# Patient Record
Sex: MALE | Race: WHITE | NOT HISPANIC OR LATINO | URBAN - METROPOLITAN AREA
[De-identification: names, ages, dates, MRNs, and addresses within clinical notes are randomized per-mention and may not be internally consistent; named-entity substitution may affect disease eponyms.]

---

## 2017-02-03 ENCOUNTER — OUTPATIENT (OUTPATIENT)
Dept: OUTPATIENT SERVICES | Facility: HOSPITAL | Age: 37
LOS: 1 days | Discharge: HOME | End: 2017-02-03

## 2017-05-02 ENCOUNTER — OUTPATIENT (OUTPATIENT)
Dept: OUTPATIENT SERVICES | Facility: HOSPITAL | Age: 37
LOS: 1 days | Discharge: HOME | End: 2017-05-02

## 2017-05-02 DIAGNOSIS — F33.2 MAJOR DEPRESSIVE DISORDER, RECURRENT SEVERE WITHOUT PSYCHOTIC FEATURES: ICD-10-CM

## 2017-06-28 DIAGNOSIS — D33.2 BENIGN NEOPLASM OF BRAIN, UNSPECIFIED: ICD-10-CM

## 2017-09-22 ENCOUNTER — OUTPATIENT (OUTPATIENT)
Dept: OUTPATIENT SERVICES | Facility: HOSPITAL | Age: 37
LOS: 1 days | Discharge: HOME | End: 2017-09-22

## 2017-09-22 DIAGNOSIS — F33.2 MAJOR DEPRESSIVE DISORDER, RECURRENT SEVERE WITHOUT PSYCHOTIC FEATURES: ICD-10-CM

## 2017-10-02 ENCOUNTER — TRANSCRIPTION ENCOUNTER (OUTPATIENT)
Age: 37
End: 2017-10-02

## 2017-11-29 ENCOUNTER — OUTPATIENT (OUTPATIENT)
Dept: OUTPATIENT SERVICES | Facility: HOSPITAL | Age: 37
LOS: 1 days | Discharge: HOME | End: 2017-11-29

## 2017-11-29 DIAGNOSIS — F33.2 MAJOR DEPRESSIVE DISORDER, RECURRENT SEVERE WITHOUT PSYCHOTIC FEATURES: ICD-10-CM

## 2017-12-27 ENCOUNTER — OUTPATIENT (OUTPATIENT)
Dept: OUTPATIENT SERVICES | Facility: HOSPITAL | Age: 37
LOS: 1 days | Discharge: HOME | End: 2017-12-27

## 2017-12-27 DIAGNOSIS — F33.2 MAJOR DEPRESSIVE DISORDER, RECURRENT SEVERE WITHOUT PSYCHOTIC FEATURES: ICD-10-CM

## 2018-01-10 ENCOUNTER — TRANSCRIPTION ENCOUNTER (OUTPATIENT)
Age: 38
End: 2018-01-10

## 2018-01-24 ENCOUNTER — OUTPATIENT (OUTPATIENT)
Dept: OUTPATIENT SERVICES | Facility: HOSPITAL | Age: 38
LOS: 1 days | Discharge: HOME | End: 2018-01-24

## 2018-01-30 DIAGNOSIS — F33.9 MAJOR DEPRESSIVE DISORDER, RECURRENT, UNSPECIFIED: ICD-10-CM

## 2018-01-30 DIAGNOSIS — T14.91XA SUICIDE ATTEMPT, INITIAL ENCOUNTER: ICD-10-CM

## 2018-01-30 DIAGNOSIS — S11.91XA LACERATION WITHOUT FOREIGN BODY OF UNSPECIFIED PART OF NECK, INITIAL ENCOUNTER: ICD-10-CM

## 2018-03-19 ENCOUNTER — TRANSCRIPTION ENCOUNTER (OUTPATIENT)
Age: 38
End: 2018-03-19

## 2018-05-01 ENCOUNTER — OUTPATIENT (OUTPATIENT)
Dept: OUTPATIENT SERVICES | Facility: HOSPITAL | Age: 38
LOS: 1 days | Discharge: HOME | End: 2018-05-01

## 2018-05-01 DIAGNOSIS — F33.2 MAJOR DEPRESSIVE DISORDER, RECURRENT SEVERE WITHOUT PSYCHOTIC FEATURES: ICD-10-CM

## 2018-05-09 ENCOUNTER — TRANSCRIPTION ENCOUNTER (OUTPATIENT)
Age: 38
End: 2018-05-09

## 2018-05-13 ENCOUNTER — EMERGENCY (EMERGENCY)
Facility: HOSPITAL | Age: 38
LOS: 0 days | Discharge: HOME | End: 2018-05-14
Attending: EMERGENCY MEDICINE | Admitting: EMERGENCY MEDICINE

## 2018-05-13 VITALS
HEART RATE: 82 BPM | OXYGEN SATURATION: 100 % | SYSTOLIC BLOOD PRESSURE: 126 MMHG | DIASTOLIC BLOOD PRESSURE: 82 MMHG | RESPIRATION RATE: 18 BRPM | TEMPERATURE: 97 F

## 2018-05-13 DIAGNOSIS — R42 DIZZINESS AND GIDDINESS: ICD-10-CM

## 2018-05-13 DIAGNOSIS — J34.9 UNSPECIFIED DISORDER OF NOSE AND NASAL SINUSES: ICD-10-CM

## 2018-05-13 DIAGNOSIS — R55 SYNCOPE AND COLLAPSE: ICD-10-CM

## 2018-05-13 LAB
ALBUMIN SERPL ELPH-MCNC: 4.3 G/DL — SIGNIFICANT CHANGE UP (ref 3.5–5.2)
ALP SERPL-CCNC: 100 U/L — SIGNIFICANT CHANGE UP (ref 30–115)
ALT FLD-CCNC: 16 U/L — SIGNIFICANT CHANGE UP (ref 0–41)
ANION GAP SERPL CALC-SCNC: 12 MMOL/L — SIGNIFICANT CHANGE UP (ref 7–14)
APPEARANCE UR: (no result)
APTT BLD: 30.2 SEC — SIGNIFICANT CHANGE UP (ref 27–39.2)
AST SERPL-CCNC: 14 U/L — SIGNIFICANT CHANGE UP (ref 0–41)
BASOPHILS # BLD AUTO: 0.03 K/UL — SIGNIFICANT CHANGE UP (ref 0–0.2)
BASOPHILS NFR BLD AUTO: 0.2 % — SIGNIFICANT CHANGE UP (ref 0–1)
BILIRUB DIRECT SERPL-MCNC: <0.2 MG/DL — SIGNIFICANT CHANGE UP (ref 0–0.2)
BILIRUB INDIRECT FLD-MCNC: >0 MG/DL — LOW (ref 0.2–1.2)
BILIRUB SERPL-MCNC: 0.2 MG/DL — SIGNIFICANT CHANGE UP (ref 0.2–1.2)
BILIRUB UR-MCNC: NEGATIVE — SIGNIFICANT CHANGE UP
BUN SERPL-MCNC: 10 MG/DL — SIGNIFICANT CHANGE UP (ref 10–20)
CALCIUM SERPL-MCNC: 8.9 MG/DL — SIGNIFICANT CHANGE UP (ref 8.5–10.1)
CHLORIDE SERPL-SCNC: 101 MMOL/L — SIGNIFICANT CHANGE UP (ref 98–110)
CO2 SERPL-SCNC: 26 MMOL/L — SIGNIFICANT CHANGE UP (ref 17–32)
COLOR SPEC: YELLOW — SIGNIFICANT CHANGE UP
CREAT SERPL-MCNC: 0.9 MG/DL — SIGNIFICANT CHANGE UP (ref 0.7–1.5)
DIFF PNL FLD: NEGATIVE — SIGNIFICANT CHANGE UP
EOSINOPHIL # BLD AUTO: 0.18 K/UL — SIGNIFICANT CHANGE UP (ref 0–0.7)
EOSINOPHIL NFR BLD AUTO: 1.4 % — SIGNIFICANT CHANGE UP (ref 0–8)
EPI CELLS # UR: (no result) /HPF
GLUCOSE SERPL-MCNC: 118 MG/DL — HIGH (ref 70–99)
GLUCOSE UR QL: NEGATIVE MG/DL — SIGNIFICANT CHANGE UP
HCT VFR BLD CALC: 42.5 % — SIGNIFICANT CHANGE UP (ref 42–52)
HGB BLD-MCNC: 14.4 G/DL — SIGNIFICANT CHANGE UP (ref 14–18)
IMM GRANULOCYTES NFR BLD AUTO: 0.7 % — HIGH (ref 0.1–0.3)
INR BLD: 1 RATIO — SIGNIFICANT CHANGE UP (ref 0.65–1.3)
KETONES UR-MCNC: NEGATIVE — SIGNIFICANT CHANGE UP
LEUKOCYTE ESTERASE UR-ACNC: NEGATIVE — SIGNIFICANT CHANGE UP
LYMPHOCYTES # BLD AUTO: 1.7 K/UL — SIGNIFICANT CHANGE UP (ref 1.2–3.4)
LYMPHOCYTES # BLD AUTO: 13.6 % — LOW (ref 20.5–51.1)
MCHC RBC-ENTMCNC: 30.2 PG — SIGNIFICANT CHANGE UP (ref 27–31)
MCHC RBC-ENTMCNC: 33.9 G/DL — SIGNIFICANT CHANGE UP (ref 32–37)
MCV RBC AUTO: 89.1 FL — SIGNIFICANT CHANGE UP (ref 80–94)
MONOCYTES # BLD AUTO: 0.51 K/UL — SIGNIFICANT CHANGE UP (ref 0.1–0.6)
MONOCYTES NFR BLD AUTO: 4.1 % — SIGNIFICANT CHANGE UP (ref 1.7–9.3)
NEUTROPHILS # BLD AUTO: 10.03 K/UL — HIGH (ref 1.4–6.5)
NEUTROPHILS NFR BLD AUTO: 80 % — HIGH (ref 42.2–75.2)
NITRITE UR-MCNC: NEGATIVE — SIGNIFICANT CHANGE UP
NRBC # BLD: 0 /100 WBCS — SIGNIFICANT CHANGE UP (ref 0–0)
PH UR: 7 — SIGNIFICANT CHANGE UP (ref 5–8)
PLATELET # BLD AUTO: 249 K/UL — SIGNIFICANT CHANGE UP (ref 130–400)
POTASSIUM SERPL-MCNC: 4.2 MMOL/L — SIGNIFICANT CHANGE UP (ref 3.5–5)
POTASSIUM SERPL-SCNC: 4.2 MMOL/L — SIGNIFICANT CHANGE UP (ref 3.5–5)
PROT SERPL-MCNC: 6.9 G/DL — SIGNIFICANT CHANGE UP (ref 6–8)
PROT UR-MCNC: NEGATIVE MG/DL — SIGNIFICANT CHANGE UP
PROTHROM AB SERPL-ACNC: 10.8 SEC — SIGNIFICANT CHANGE UP (ref 9.95–12.87)
RBC # BLD: 4.77 M/UL — SIGNIFICANT CHANGE UP (ref 4.7–6.1)
RBC # FLD: 11.9 % — SIGNIFICANT CHANGE UP (ref 11.5–14.5)
SODIUM SERPL-SCNC: 139 MMOL/L — SIGNIFICANT CHANGE UP (ref 135–146)
SP GR SPEC: 1.01 — SIGNIFICANT CHANGE UP (ref 1.01–1.03)
TROPONIN T SERPL-MCNC: <0.01 NG/ML — SIGNIFICANT CHANGE UP
UROBILINOGEN FLD QL: 1 MG/DL (ref 0.2–0.2)
WBC # BLD: 12.54 K/UL — HIGH (ref 4.8–10.8)
WBC # FLD AUTO: 12.54 K/UL — HIGH (ref 4.8–10.8)

## 2018-05-13 RX ORDER — SODIUM CHLORIDE 9 MG/ML
1000 INJECTION INTRAMUSCULAR; INTRAVENOUS; SUBCUTANEOUS ONCE
Qty: 0 | Refills: 0 | Status: COMPLETED | OUTPATIENT
Start: 2018-05-13 | End: 2018-05-13

## 2018-05-13 RX ORDER — ONDANSETRON 8 MG/1
4 TABLET, FILM COATED ORAL ONCE
Qty: 0 | Refills: 0 | Status: COMPLETED | OUTPATIENT
Start: 2018-05-13 | End: 2018-05-13

## 2018-05-13 RX ADMIN — ONDANSETRON 4 MILLIGRAM(S): 8 TABLET, FILM COATED ORAL at 23:21

## 2018-05-13 RX ADMIN — ONDANSETRON 104 MILLIGRAM(S): 8 TABLET, FILM COATED ORAL at 21:28

## 2018-05-13 RX ADMIN — SODIUM CHLORIDE 1000 MILLILITER(S): 9 INJECTION INTRAMUSCULAR; INTRAVENOUS; SUBCUTANEOUS at 20:01

## 2018-05-13 NOTE — ED PROVIDER NOTE - OBJECTIVE STATEMENT
patient states that on Wednesday, started feeling dizzy and lightheaded, passed out for 2-3mins, at that time went to urgent care center, had EKG done, was told could have been vertigo, and was recommended to go to ED for any recurrent symptoms. patient states that today started feeling dizzy again, described as lightheadedness, associated with nausea, also feels spinning sensation sometimes, states that symptoms lasted all day long, so decided to come to ED. Denies syncope this time, denies any HA/neck pain, denies any URI symptoms, denies any ear pain, denies any cp/sob/abd pain; +palpitations. Denies any diarrhea, denies any fever.

## 2018-05-13 NOTE — ED ADULT NURSE NOTE - CHPI ED SYMPTOMS NEG
no confusion/no vomiting/no numbness/no fever/no change in level of consciousness/no loss of consciousness/no blurred vision/no weakness

## 2018-05-13 NOTE — ED CDU PROVIDER INITIAL DAY NOTE - ATTENDING CONTRIBUTION TO CARE
38 yo male with unwitnessed syncope 5 days ago presented to ED for eval, head ct and labs negative.  Pt awaiting ECHO.

## 2018-05-13 NOTE — ED CDU PROVIDER INITIAL DAY NOTE - OBJECTIVE STATEMENT
38y/o male with pmh of migraine ha, c/o syncopal episode 5 days ago. pt. states at that time he was sitting and he passed out. brief, unwitnessed syncope at work. today comes in for nausea and dizziness. + mild ha. denies numbness, weakness, cp, sob, abdominal pain.

## 2018-05-13 NOTE — ED ADULT TRIAGE NOTE - CHIEF COMPLAINT QUOTE
Pt c/o feeling dizzy and passing out last week Wed, went to urgent care on Thurs and diagnosed with vertigo. Started feeling symptoms again yesterday, today c/o increasing dizziness and nausea

## 2018-05-13 NOTE — ED ADULT NURSE NOTE - OBJECTIVE STATEMENT
as per pt, he passed out last week, and urgent care told him he had vertigo, symptoms returned yesterday, pt. states he is currently dizzy and nauseous

## 2018-05-13 NOTE — ED CDU PROVIDER INITIAL DAY NOTE - NEURO NEGATIVE STATEMENT, MLM
no loss of consciousness, no gait abnormality, no headache, no sensory deficits, and no weakness. + dizziness, + syncope

## 2018-05-14 VITALS
HEART RATE: 70 BPM | OXYGEN SATURATION: 98 % | DIASTOLIC BLOOD PRESSURE: 58 MMHG | SYSTOLIC BLOOD PRESSURE: 114 MMHG | TEMPERATURE: 98 F | RESPIRATION RATE: 18 BRPM

## 2018-05-14 LAB
CK MB CFR SERPL CALC: 1.4 NG/ML — SIGNIFICANT CHANGE UP (ref 0.6–6.3)
CK SERPL-CCNC: 75 U/L — SIGNIFICANT CHANGE UP (ref 0–225)
TROPONIN T SERPL-MCNC: <0.01 NG/ML — SIGNIFICANT CHANGE UP

## 2018-05-14 NOTE — ED CDU PROVIDER SUBSEQUENT DAY NOTE - HISTORY
Pt seen at bedside, NAD. Arrived overnight, received from LUIS Giron. Pt presenting for syncope which occurred 5 days prior. Admits to dizziness and nausea. Head CT negative. Cardiac enzymes negative x 2. ECHO pending, will follow up with results

## 2018-05-14 NOTE — ED ADULT NURSE REASSESSMENT NOTE - NS ED NURSE REASSESS COMMENT FT1
Pt assessed A.O times 4 Vs stable on cardiac monitor denies no chest pain, no SOB ,no N/V  or dizziness ,breakfast tray provide did eat 100% ,NAD noted waiting for 2DEChO ED attending  made aware on going nursing observation .

## 2018-05-14 NOTE — ED ADULT NURSE REASSESSMENT NOTE - NS ED NURSE REASSESS COMMENT FT1
Pt remain comfortable no pain no SOB no N/V ,no dizziness at this time did eat 100% of lunch is seen evaluate by ED attending clear to go home with family members ,NAD noted ready to go home .

## 2018-05-14 NOTE — ED CDU PROVIDER DISPOSITION NOTE - CLINICAL COURSE
Pt seen for syncope 5 days prior to arrival. Head ct negative except sinus disease as noted, obs protocol for syncope with no acute findings. Pt asymptomatic. Pt to follow up with pmd and ent for sinus disease. Return precautions discussed.

## 2018-05-18 ENCOUNTER — INPATIENT (INPATIENT)
Facility: HOSPITAL | Age: 38
LOS: 10 days | Discharge: HOME | End: 2018-05-29
Attending: PSYCHIATRY & NEUROLOGY | Admitting: PSYCHIATRY & NEUROLOGY

## 2018-05-18 VITALS
HEIGHT: 64 IN | SYSTOLIC BLOOD PRESSURE: 112 MMHG | TEMPERATURE: 99 F | DIASTOLIC BLOOD PRESSURE: 58 MMHG | RESPIRATION RATE: 20 BRPM | WEIGHT: 143.96 LBS | HEART RATE: 90 BPM | OXYGEN SATURATION: 98 %

## 2018-05-18 DIAGNOSIS — K46.9 UNSPECIFIED ABDOMINAL HERNIA WITHOUT OBSTRUCTION OR GANGRENE: Chronic | ICD-10-CM

## 2018-05-18 DIAGNOSIS — F32.2 MAJOR DEPRESSIVE DISORDER, SINGLE EPISODE, SEVERE WITHOUT PSYCHOTIC FEATURES: ICD-10-CM

## 2018-05-18 DIAGNOSIS — F33.2 MAJOR DEPRESSIVE DISORDER, RECURRENT SEVERE WITHOUT PSYCHOTIC FEATURES: ICD-10-CM

## 2018-05-18 LAB
ALBUMIN SERPL ELPH-MCNC: 4.6 G/DL — SIGNIFICANT CHANGE UP (ref 3.5–5.2)
ALP SERPL-CCNC: 109 U/L — SIGNIFICANT CHANGE UP (ref 30–115)
ALT FLD-CCNC: 18 U/L — SIGNIFICANT CHANGE UP (ref 0–41)
ANION GAP SERPL CALC-SCNC: 16 MMOL/L — HIGH (ref 7–14)
APAP SERPL-MCNC: <5 UG/ML — LOW (ref 10–30)
AST SERPL-CCNC: 16 U/L — SIGNIFICANT CHANGE UP (ref 0–41)
BASOPHILS # BLD AUTO: 0.03 K/UL — SIGNIFICANT CHANGE UP (ref 0–0.2)
BASOPHILS NFR BLD AUTO: 0.3 % — SIGNIFICANT CHANGE UP (ref 0–1)
BILIRUB SERPL-MCNC: 0.2 MG/DL — SIGNIFICANT CHANGE UP (ref 0.2–1.2)
BUN SERPL-MCNC: 7 MG/DL — LOW (ref 10–20)
CALCIUM SERPL-MCNC: 9.7 MG/DL — SIGNIFICANT CHANGE UP (ref 8.5–10.1)
CHLORIDE SERPL-SCNC: 96 MMOL/L — LOW (ref 98–110)
CO2 SERPL-SCNC: 27 MMOL/L — SIGNIFICANT CHANGE UP (ref 17–32)
CREAT SERPL-MCNC: 0.9 MG/DL — SIGNIFICANT CHANGE UP (ref 0.7–1.5)
EOSINOPHIL # BLD AUTO: 0.26 K/UL — SIGNIFICANT CHANGE UP (ref 0–0.7)
EOSINOPHIL NFR BLD AUTO: 2.6 % — SIGNIFICANT CHANGE UP (ref 0–8)
ETHANOL SERPL-MCNC: <10 MG/DL — HIGH
GLUCOSE SERPL-MCNC: 190 MG/DL — HIGH (ref 70–99)
HCT VFR BLD CALC: 45 % — SIGNIFICANT CHANGE UP (ref 42–52)
HGB BLD-MCNC: 15.1 G/DL — SIGNIFICANT CHANGE UP (ref 14–18)
IMM GRANULOCYTES NFR BLD AUTO: 0.7 % — HIGH (ref 0.1–0.3)
LYMPHOCYTES # BLD AUTO: 1.7 K/UL — SIGNIFICANT CHANGE UP (ref 1.2–3.4)
LYMPHOCYTES # BLD AUTO: 16.7 % — LOW (ref 20.5–51.1)
MCHC RBC-ENTMCNC: 30.5 PG — SIGNIFICANT CHANGE UP (ref 27–31)
MCHC RBC-ENTMCNC: 33.6 G/DL — SIGNIFICANT CHANGE UP (ref 32–37)
MCV RBC AUTO: 90.9 FL — SIGNIFICANT CHANGE UP (ref 80–94)
MONOCYTES # BLD AUTO: 0.73 K/UL — HIGH (ref 0.1–0.6)
MONOCYTES NFR BLD AUTO: 7.2 % — SIGNIFICANT CHANGE UP (ref 1.7–9.3)
NEUTROPHILS # BLD AUTO: 7.37 K/UL — HIGH (ref 1.4–6.5)
NEUTROPHILS NFR BLD AUTO: 72.5 % — SIGNIFICANT CHANGE UP (ref 42.2–75.2)
NRBC # BLD: 0 /100 WBCS — SIGNIFICANT CHANGE UP (ref 0–0)
PLATELET # BLD AUTO: 251 K/UL — SIGNIFICANT CHANGE UP (ref 130–400)
POTASSIUM SERPL-MCNC: 4.3 MMOL/L — SIGNIFICANT CHANGE UP (ref 3.5–5)
POTASSIUM SERPL-SCNC: 4.3 MMOL/L — SIGNIFICANT CHANGE UP (ref 3.5–5)
PROT SERPL-MCNC: 7.3 G/DL — SIGNIFICANT CHANGE UP (ref 6–8)
RBC # BLD: 4.95 M/UL — SIGNIFICANT CHANGE UP (ref 4.7–6.1)
RBC # FLD: 12 % — SIGNIFICANT CHANGE UP (ref 11.5–14.5)
SALICYLATES SERPL-MCNC: <0.3 MG/DL — LOW (ref 4–30)
SODIUM SERPL-SCNC: 139 MMOL/L — SIGNIFICANT CHANGE UP (ref 135–146)
WBC # BLD: 10.16 K/UL — SIGNIFICANT CHANGE UP (ref 4.8–10.8)
WBC # FLD AUTO: 10.16 K/UL — SIGNIFICANT CHANGE UP (ref 4.8–10.8)

## 2018-05-18 RX ORDER — CITALOPRAM 10 MG/1
40 TABLET, FILM COATED ORAL DAILY
Qty: 0 | Refills: 0 | Status: DISCONTINUED | OUTPATIENT
Start: 2018-05-18 | End: 2018-05-22

## 2018-05-18 RX ORDER — ARIPIPRAZOLE 15 MG/1
10 TABLET ORAL DAILY
Qty: 0 | Refills: 0 | Status: DISCONTINUED | OUTPATIENT
Start: 2018-05-18 | End: 2018-05-22

## 2018-05-18 RX ADMIN — ARIPIPRAZOLE 10 MILLIGRAM(S): 15 TABLET ORAL at 20:19

## 2018-05-18 RX ADMIN — CITALOPRAM 40 MILLIGRAM(S): 10 TABLET, FILM COATED ORAL at 20:19

## 2018-05-18 NOTE — ED PROVIDER NOTE - PHYSICAL EXAMINATION
GEN: Well appearing.    HEAD:  Normocephalic, atraumatic.    EYES:  Clear conjunctivae without injection, drainage or discharge.    ENMT:  Nasal mucosa moist; mouth moist without ulcerations or lesions; throat moist without erythema, exudate, ulcerations or lesions.    NECK:  Supple, no masses. Normal ROM.    CARDIAC:  RRR, normal S1 and S2, no murmurs, rubs or gallops.    RESP:  Respiratory rate and effort appear normal; lungs are clear to auscultation bilaterally; no rhonchi, rales or wheezes.    ABDOMEN:  Soft, non-tender, non-distended, no masses. Normal BS throughout.    NEURO:  AAO x 3. Motor 5/5.     SKIN:  Normal skin color for age and race, well-perfused; warm and dry.

## 2018-05-18 NOTE — BEHAVIORAL HEALTH ASSESSMENT NOTE - SUMMARY
presenting with acute exacerbation of depression and suicidal thought with fear of being alone. despite medication management needs ipp for safety and stabilization.

## 2018-05-18 NOTE — ED PROVIDER NOTE - PROGRESS NOTE DETAILS
Dr. Goode (Psych) eval in the ED and will adm.  Pt to be held in ED pending psych adm. Received from Dr Temple. Pt medically cleared on  hold Pt was endorsed to Dr. Smith 5/19/18 19:30, pending bed availability. Signed out to Dr. Kellogg. Awaiting psych bed. Pt has had no acute events today; Pt has no complaints. Pt endorsed to Dr. Smith. Signed out to Dr. Chapa.

## 2018-05-18 NOTE — BEHAVIORAL HEALTH ASSESSMENT NOTE - OTHER PAST PSYCHIATRIC HISTORY (INCLUDE DETAILS REGARDING ONSET, COURSE OF ILLNESS, INPATIENT/OUTPATIENT TREATMENT)
Dx of MDD Recurrent severe suicide attempt two years ago ipp currently attends opd.  celexa 40 mg po daily, abilify 10 mg po hs

## 2018-05-18 NOTE — ED PROVIDER NOTE - NS ED ROS FT
Constitutional: No fevers/chills.    Head: No injury, headache.    Eyes:  No visual changes, eye pain or discharge. No injury.    ENMT:  No hearing changes, pain, discharge or infections. No neck pain or stiffness. No loss ROM.    Cardiac:  No chest pain, SOB or edema. No chest pain with exertion.    Respiratory:  No cough or respiratory distress.     GI:  No nausea, vomiting, diarrhea or abdominal pain. No anorexia. No change in PO intake.    :  No frequency, urgency or burning. No change in urine output.    MS:  No leg pain, leg swelling, myalgia, muscle weakness, joint pain or back pain. No loss ROM.    Neuro:  No dizziness or weakness.  No LOC.    Skin:  No skin rash. Constitutional: No fevers/chills.    Head: No injury, headache.    Eyes:  No visual changes, eye pain or discharge. No injury.    ENMT:  No hearing changes, pain, discharge or infections. No neck pain or stiffness. No loss ROM.    Cardiac:  No chest pain, SOB or edema. No chest pain with exertion.    Respiratory:  No cough or respiratory distress.     GI:  No nausea, vomiting, diarrhea or abdominal pain. No anorexia. No change in PO intake.    :  No frequency, urgency or burning. No change in urine output.    MS:  No leg pain, leg swelling, myalgia, muscle weakness, joint pain or back pain. No loss ROM.    Neuro:  No dizziness or weakness.  No LOC.    Skin:  No skin rash.    Psych:  +depressed, +SI

## 2018-05-18 NOTE — ED ADULT TRIAGE NOTE - CHIEF COMPLAINT QUOTE
"I've been depressed for the last month and half." Pt advises he has a history of depression. Over last month and half he transitioned to new therapist. He wasn't taking his medications during this time and only restarted one week ago (Abilify and Citalopram).

## 2018-05-18 NOTE — BEHAVIORAL HEALTH ASSESSMENT NOTE - PROBLEM SELECTOR PLAN 1
admit to ipp  enhanced supervision  celexa 40 mg po daily   abilify 10 mg po daily  ativan 1 mg q 6hrs prn  group and individual therapy

## 2018-05-18 NOTE — BEHAVIORAL HEALTH ASSESSMENT NOTE - HPI (INCLUDE ILLNESS QUALITY, SEVERITY, DURATION, TIMING, CONTEXT, MODIFYING FACTORS, ASSOCIATED SIGNS AND SYMPTOMS)
pt came in with his friend. c/o feeling increasingly depressed for the past 1 month and medication treatment was interrupted due to change in doctors. he is currently f/u at [facility] opd by [facility] resident MD. Mood feels low , dysphoric, loss of interest poor focus concentration poor sleep for the past week. increasingly worried about full relapse in sx and not feeling safe with himself due to suicidal ideation and past attempt.  denies any drug or alcohol use. denies any panic episode. no manic sx. denies any psychotic sx. works two jobs lives with parents who is not very understanding or supportive.

## 2018-05-19 RX ADMIN — ARIPIPRAZOLE 10 MILLIGRAM(S): 15 TABLET ORAL at 12:51

## 2018-05-19 RX ADMIN — Medication 1 MILLIGRAM(S): at 02:04

## 2018-05-19 RX ADMIN — CITALOPRAM 40 MILLIGRAM(S): 10 TABLET, FILM COATED ORAL at 12:51

## 2018-05-19 RX ADMIN — Medication 1 MILLIGRAM(S): at 22:58

## 2018-05-20 RX ADMIN — ARIPIPRAZOLE 10 MILLIGRAM(S): 15 TABLET ORAL at 12:22

## 2018-05-20 RX ADMIN — CITALOPRAM 40 MILLIGRAM(S): 10 TABLET, FILM COATED ORAL at 12:22

## 2018-05-21 DIAGNOSIS — Z98.890 OTHER SPECIFIED POSTPROCEDURAL STATES: Chronic | ICD-10-CM

## 2018-05-21 DIAGNOSIS — F32.9 MAJOR DEPRESSIVE DISORDER, SINGLE EPISODE, UNSPECIFIED: ICD-10-CM

## 2018-05-21 RX ORDER — CITALOPRAM 10 MG/1
40 TABLET, FILM COATED ORAL DAILY
Qty: 0 | Refills: 0 | Status: DISCONTINUED | OUTPATIENT
Start: 2018-05-21 | End: 2018-05-22

## 2018-05-21 RX ORDER — ARIPIPRAZOLE 15 MG/1
10 TABLET ORAL DAILY
Qty: 0 | Refills: 0 | Status: DISCONTINUED | OUTPATIENT
Start: 2018-05-21 | End: 2018-05-22

## 2018-05-21 RX ORDER — CLONAZEPAM 1 MG
0.5 TABLET ORAL EVERY 12 HOURS
Qty: 0 | Refills: 0 | Status: DISCONTINUED | OUTPATIENT
Start: 2018-05-21 | End: 2018-05-24

## 2018-05-21 RX ADMIN — CITALOPRAM 40 MILLIGRAM(S): 10 TABLET, FILM COATED ORAL at 11:21

## 2018-05-21 RX ADMIN — ARIPIPRAZOLE 10 MILLIGRAM(S): 15 TABLET ORAL at 11:21

## 2018-05-21 NOTE — H&P ADULT - NSHPPHYSICALEXAM_GEN_ALL_CORE
PHYSICAL EXAM:    Vital Signs Last 24 Hrs    T(F): 96.5 (05-21-18 @ 16:53), Max: 98 (05-21-18 @ 11:30)  HR: 66 (05-21-18 @ 16:53) (60 - 96)  BP: 128/61 (05-21-18 @ 15:07)  RR: 16 (05-21-18 @ 16:53) (15 - 18)  SpO2: 97% (05-21-18 @ 15:07) (97% - 98%)    Constitutional: NAD, A&O x3    Eyes: PERRLA    Respiratory: +air entry, no rales, no rhonchi, no wheezes    Cardiovascular: +S1 and S2, regular rate and rhythm, No murmur    Gastrointestinal: +BS, soft, non-tender, not distended    Extremities:  no edema, no calf tenderness    Vascular: +dorsal pedis and radial pulses, no extremity cyanosis    Neurological: sensation intact, ROM equal B/L, CN II-XII intact    Skin: no rashes, normal turgor

## 2018-05-21 NOTE — PROGRESS NOTE ADULT - SUBJECTIVE AND OBJECTIVE BOX
pt had good sleep. mood continue to be depressed, anxious and feels unsafe to return home due to fear of hurting himself. motivated to stay on medications and work through therapy. no psychosis. no threat to others. i/j fair.

## 2018-05-21 NOTE — H&P ADULT - HISTORY OF PRESENT ILLNESS
34 y/o male seen in [facility] for feelings of incresased Depression X 1 1/2 month duration. PSH: Depression, 2 prior admissions to IPP units in the past. There was a problem with his Psych meds when transitioning from one Doctor to another. he missed his Psychiatric meds for about 1 month. Normally takes Abilify and Celexa at home. 36 y/o male seen in [facility] 5/18/18 for feelings of incresased Depression X 1 1/2 month duration. PSH: Depression, 2 prior admissions to IPP units in the past. There was a problem with his Psych meds when transitioning from one Doctor to another. he missed his Psychiatric meds for about 1 month. Normally takes Abilify and Celexa at home. 34 y/o male seen in [facility] 5/[facility]/18 for feelings of increased Depression X 1 1/2 month duration. PSH: Depression, 2 prior admissions to IPP units in the past. There was a problem with his Psych meds when transitioning from one Doctor to another. he missed his Psychiatric meds for about 1 month. Normally takes Abilify and Celexa at home.

## 2018-05-21 NOTE — ED ADULT NURSE REASSESSMENT NOTE - NS ED NURSE REASSESS COMMENT FT1
Pt resting comfortably at this time.
PT is admitted in [facility], awaiting bed for psych unit; pt and fam made aware. PT denies any SI/HI. Pt is calm and cooperative throughout admission process. PT is a 1/2 ppd smoker, refuses nicotine cessation, refused flu vaccine. Safety measures in place will monitor
received pt as ER Psych hold, on 1:1 sit for safety, no distress, needs attended, denies thoughts of hurting self as of this time, kept safe & comfortable
Patient assessed, vital signs stable, no acute distress present, 1:1 at bedside, will continue to closely monitor.

## 2018-05-22 RX ORDER — ARIPIPRAZOLE 15 MG/1
5 TABLET ORAL DAILY
Qty: 0 | Refills: 0 | Status: DISCONTINUED | OUTPATIENT
Start: 2018-05-23 | End: 2018-05-29

## 2018-05-22 RX ORDER — CITALOPRAM 10 MG/1
20 TABLET, FILM COATED ORAL DAILY
Qty: 0 | Refills: 0 | Status: DISCONTINUED | OUTPATIENT
Start: 2018-05-22 | End: 2018-05-22

## 2018-05-22 RX ORDER — CITALOPRAM 10 MG/1
20 TABLET, FILM COATED ORAL DAILY
Qty: 0 | Refills: 0 | Status: DISCONTINUED | OUTPATIENT
Start: 2018-05-23 | End: 2018-05-25

## 2018-05-22 RX ORDER — ARIPIPRAZOLE 15 MG/1
5 TABLET ORAL DAILY
Qty: 0 | Refills: 0 | Status: DISCONTINUED | OUTPATIENT
Start: 2018-05-22 | End: 2018-05-22

## 2018-05-22 RX ADMIN — CITALOPRAM 40 MILLIGRAM(S): 10 TABLET, FILM COATED ORAL at 08:15

## 2018-05-22 RX ADMIN — ARIPIPRAZOLE 10 MILLIGRAM(S): 15 TABLET ORAL at 08:16

## 2018-05-22 NOTE — CHART NOTE - NSCHARTNOTEFT_GEN_A_CORE
Social work admit note:    PT is a 37 year old single white male brought to the ED by his friend for mental health evaluation due to feeling increasingly depressed for the past 1 month after medication treatment was interrupted due to change in MD at his out patient clinic.     PT is currently receiving out patient treatment at [facility] by Dr. Galvan. PT reports that mood is low, dysphoric, loss of interest, poor focus/concentration, insomnia for the past week. PT last admitted to [facility] 6/2016 after serious suicide attempt via laceration to the throat.    PT reports some recreational marijuana use, denies substance abuse or dependance, denies any legal issues. PT lives with parents, he is employed at a local hotel, he does not feel that his parents are understanding or supportive of his mental health issues.    See social work assessment for further information. PT was admitted on involuntary status.

## 2018-05-22 NOTE — CONSULT NOTE ADULT - SUBJECTIVE AND OBJECTIVE BOX
DARREN GAINES  37y  Male      Patient is a 37y old  Male who presents with a chief complaint of Depression (21 May 2018 17:51)    HPI:  36 y/o male seen in [facility] 5/18/18 for feelings of increased Depression X 1 1/2 month duration. PSH: Depression, 2 prior admissions to IPP units in the past. There was a problem with his Psych meds when transitioning from one Doctor to another. he missed his Psychiatric meds for about 1 month. Normally takes Abilify and Celexa at home. (21 May 2018 17:51)    INTERVAL HPI/OVERNIGHT EVENTS:  HEALTH ISSUES - PROBLEM Dx:  Depression: Depression  Severe episode of recurrent major depressive disorder, without psychotic features  Current severe episode of major depressive disorder without psychotic features without prior episode: Current severe episode of major depressive disorder without psychotic features without prior episode        PAST MEDICAL & SURGICAL HISTORY:  Vertigo  Depression  History of inguinal hernia repair: Left    FAMILY HISTORY:  No pertinent family history in first degree relatives    ARIPiprazole 10 milliGRAM(s) Oral daily  ARIPiprazole 10 milliGRAM(s) Oral daily  citalopram 40 milliGRAM(s) Oral daily  citalopram 40 milliGRAM(s) Oral daily  clonazePAM Tablet 0.5 milliGRAM(s) Oral every 12 hours PRN  LORazepam     Tablet 1 milliGRAM(s) Oral every 6 hours PRN    Allergies    No Known Drug Allergies  strawberry (Rash)    Intolerances        REVIEW OF SYSTEMS:  CONSTITUTIONAL: No fever, weight loss, or fatigue  EYES: No eye pain, visual disturbances, or discharge  ENMT:  No difficulty hearing, tinnitus, vertigo; No sinus or throat pain  NECK: No pain or stiffness  BREASTS: No pain, masses, or nipple discharge  RESPIRATORY: No cough, wheezing, chills or hemoptysis; No shortness of breath  CARDIOVASCULAR: No chest pain, palpitations, dizziness, or leg swelling  GASTROINTESTINAL: No abdominal or epigastric pain. No nausea, vomiting, or hematemesis; No diarrhea or constipation. No melena or hematochezia.  GENITOURINARY: No dysuria, frequency, hematuria, or incontinence  NEUROLOGICAL: No headaches, memory loss, loss of strength, numbness, or tremors  SKIN: No itching, burning, rashes, or lesions   LYMPH NODES: No enlarged glands  ENDOCRINE: No heat or cold intolerance; No hair loss  MUSCULOSKELETAL: No joint pain or swelling; No muscle, back, or extremity pain  PSYCHIATRIC: as per hpi and previous psych history  HEME/LYMPH: No easy bruising, or bleeding gums  ALLERY AND IMMUNOLOGIC: No hives or eczema    T(C): 35.7 (05-22-18 @ 05:55), Max: 36.7 (05-21-18 @ 11:30)  HR: 56 (05-22-18 @ 05:55) (56 - 76)  BP: 115/52 (05-22-18 @ 05:55) (100/66 - 128/61)  RR: 19 (05-22-18 @ 05:55) (16 - 19)  SpO2: 97% (05-21-18 @ 15:07) (97% - 97%)  Wt(kg): --Vital Signs Last 24 Hrs  T(C): 35.7 (22 May 2018 05:55), Max: 36.7 (21 May 2018 11:30)  T(F): 96.3 (22 May 2018 05:55), Max: 98 (21 May 2018 11:30)  HR: 56 (22 May 2018 05:55) (56 - 76)  BP: 115/52 (22 May 2018 05:55) (100/66 - 128/61)  BP(mean): --  RR: 19 (22 May 2018 05:55) (16 - 19)  SpO2: 97% (21 May 2018 15:07) (97% - 97%)    PHYSICAL EXAM:  GENERAL: NAD, well-groomed, well-developed  HEAD:  Atraumatic, Normocephalic  EYES: EOMI, PERRLA, conjunctiva and sclera clear  ENMT: No tonsillar erythema, exudates, or enlargement; Moist mucous membranes, Good dentition, No lesions  NECK: Supple, No JVD, Normal thyroid  NERVOUS SYSTEM:  Alert & Oriented X3, Good concentration; Motor Strength 5/5 B/L upper and lower extremities; DTRs 2+ intact and symmetric  CHEST/LUNG: Clear to percussion bilaterally; No rales, rhonchi, wheezing, or rubs  HEART: Regular rate and rhythm; No murmurs, rubs, or gallops  ABDOMEN: Soft, Nontender, Nondistended; Bowel sounds present  EXTREMITIES:  2+ Peripheral Pulses, No clubbing, cyanosis, or edema  LYMPH: No lymphadenopathy noted  SKIN: No rashes or lesions  Neuro: alert  no focal deficits    Consultant(s) Notes Reviewed:  [x ] YES  [ ] NO  Care Discussed with Consultants/Other Providers [ x] YES  [ ] NO    LABS:              CAPILLARY BLOOD GLUCOSE                RADIOLOGY & ADDITIONAL TESTS:    Imaging Personally Reviewed:  [ ] YES  [ ] NO

## 2018-05-23 DIAGNOSIS — F33.41 MAJOR DEPRESSIVE DISORDER, RECURRENT, IN PARTIAL REMISSION: ICD-10-CM

## 2018-05-23 RX ADMIN — ARIPIPRAZOLE 5 MILLIGRAM(S): 15 TABLET ORAL at 08:04

## 2018-05-23 RX ADMIN — CITALOPRAM 20 MILLIGRAM(S): 10 TABLET, FILM COATED ORAL at 08:04

## 2018-05-23 RX ADMIN — Medication 1 MILLIGRAM(S): at 21:53

## 2018-05-23 NOTE — PROGRESS NOTE BEHAVIORAL HEALTH - NSBHCHARTREVIEWINVESTIGATE_PSY_A_CORE FT
QTC Calculation(Bezet) 388 ms    P Axis 61 degrees    R Axis 78 degrees    T Axis 63 degrees    Diagnosis Line Normal sinus rhythm  Normal ECG    Confirmed by MOY HART, MARSHALL ([id number]) on 5/19/2018 8:23:39 AM
QTC Calculation(Bezet) 388 ms    P Axis 61 degrees    R Axis 78 degrees    T Axis 63 degrees    Diagnosis Line Normal sinus rhythm  Normal ECG    Confirmed by MOY HART, MARSHALL ([id number]) on 5/19/2018 8:23:39 AM

## 2018-05-24 RX ADMIN — ARIPIPRAZOLE 5 MILLIGRAM(S): 15 TABLET ORAL at 08:18

## 2018-05-24 RX ADMIN — CITALOPRAM 20 MILLIGRAM(S): 10 TABLET, FILM COATED ORAL at 08:18

## 2018-05-24 NOTE — PROGRESS NOTE ADULT - SUBJECTIVE AND OBJECTIVE BOX
pt stable alert in NAD  no new complaints    DEPRESSION  ^PANIC ATTACK  No h/o HF  No pertinent family history in first degree relatives  Handoff  MEWS Score  Vertigo  Depression  No pertinent past medical history  Depression  Recurrent major depressive disorder, in partial remission  Depression  Severe episode of recurrent major depressive disorder, without psychotic features  Current severe episode of major depressive disorder without psychotic features without prior episode  History of inguinal hernia repair  Abdominal hernia  No significant past surgical history  PANIC ATTACK  4    HEALTH ISSUES - PROBLEM Dx:  Recurrent major depressive disorder, in partial remission: Recurrent major depressive disorder, in partial remission  Depression: Depression  Severe episode of recurrent major depressive disorder, without psychotic features  Current severe episode of major depressive disorder without psychotic features without prior episode: Current severe episode of major depressive disorder without psychotic features without prior episode        PAST MEDICAL & SURGICAL HISTORY:  Vertigo  Depression  History of inguinal hernia repair: Left    No Known Drug Allergies  strawberry (Rash)      FAMILY HISTORY:  No pertinent family history in first degree relatives      ARIPiprazole 5 milliGRAM(s) Oral daily  citalopram 20 milliGRAM(s) Oral daily  clonazePAM Tablet 0.5 milliGRAM(s) Oral every 12 hours PRN  LORazepam     Tablet 1 milliGRAM(s) Oral every 6 hours PRN      T(C): 36.1 (05-24-18 @ 06:19), Max: 36.5 (05-23-18 @ 18:45)  HR: 83 (05-24-18 @ 06:19) (68 - 83)  BP: 107/57 (05-24-18 @ 06:19) (107/57 - 130/64)  RR: 17 (05-24-18 @ 06:19) (16 - 18)  SpO2: --    PE;  general:  stabel israel cute changes    Lungs:    Heart:    EXT:    Neuro: no deficits                          CAPILLARY BLOOD GLUCOSE

## 2018-05-25 RX ORDER — ACETAMINOPHEN 500 MG
650 TABLET ORAL EVERY 6 HOURS
Qty: 0 | Refills: 0 | Status: DISCONTINUED | OUTPATIENT
Start: 2018-05-25 | End: 2018-05-29

## 2018-05-25 RX ORDER — CITALOPRAM 10 MG/1
40 TABLET, FILM COATED ORAL DAILY
Qty: 0 | Refills: 0 | Status: DISCONTINUED | OUTPATIENT
Start: 2018-05-25 | End: 2018-05-29

## 2018-05-25 RX ADMIN — CITALOPRAM 20 MILLIGRAM(S): 10 TABLET, FILM COATED ORAL at 08:35

## 2018-05-25 RX ADMIN — Medication 650 MILLIGRAM(S): at 10:06

## 2018-05-25 RX ADMIN — CITALOPRAM 40 MILLIGRAM(S): 10 TABLET, FILM COATED ORAL at 16:10

## 2018-05-25 RX ADMIN — ARIPIPRAZOLE 5 MILLIGRAM(S): 15 TABLET ORAL at 08:35

## 2018-05-26 LAB
CHOLEST SERPL-MCNC: 162 MG/DL — SIGNIFICANT CHANGE UP (ref 100–200)
ESTIMATED AVERAGE GLUCOSE: 154 MG/DL — HIGH (ref 68–114)
HBA1C BLD-MCNC: 7 % — HIGH (ref 4–5.6)
HDLC SERPL-MCNC: 43 MG/DL — SIGNIFICANT CHANGE UP (ref 40–125)
LIPID PNL WITH DIRECT LDL SERPL: 99 MG/DL — SIGNIFICANT CHANGE UP (ref 4–129)
TOTAL CHOLESTEROL/HDL RATIO MEASUREMENT: 3.8 RATIO — LOW (ref 4–5.5)
TRIGL SERPL-MCNC: 131 MG/DL — SIGNIFICANT CHANGE UP (ref 10–149)

## 2018-05-26 RX ORDER — TRAZODONE HCL 50 MG
50 TABLET ORAL ONCE
Qty: 0 | Refills: 0 | Status: COMPLETED | OUTPATIENT
Start: 2018-05-26 | End: 2018-05-26

## 2018-05-26 RX ADMIN — Medication 50 MILLIGRAM(S): at 22:56

## 2018-05-26 RX ADMIN — ARIPIPRAZOLE 5 MILLIGRAM(S): 15 TABLET ORAL at 08:17

## 2018-05-26 RX ADMIN — CITALOPRAM 40 MILLIGRAM(S): 10 TABLET, FILM COATED ORAL at 08:17

## 2018-05-26 NOTE — PROGRESS NOTE BEHAVIORAL HEALTH - PROBLEM SELECTOR PROBLEM 1
Recurrent major depressive disorder, in partial remission

## 2018-05-26 NOTE — PROGRESS NOTE BEHAVIORAL HEALTH - SUMMARY
Pt is a 36 yo M, single, employed, with a history of major depressive disorder. He presented to the ED with worsening depression and suicidal ideation in the context of having run out of his medication for about 1 month. Pt states that his current mood is better but still low with prominent anhedonia and low energy. Pt is currently denying SI, HI, AVH.
Pt known to [facility] opd, works with Dr Galvan, pt yesterday presented to ER with friend with worsening mood thoughts of SI and worry that he may do something to harm himself. Pt admitted and in ER hold on 1:1 obs given lack of IPP beds. Tolerating Celexa, has Ativan as PRN.
Pt is a 38 yo M, single, employed, with a history of major depressive disorder. He presented to the ED with worsening depression and suicidal ideation in the context of having run out of his medication for about 1 month. Pt states that his current mood is better but still low with prominent anhedonia and low energy. Pt is currently denying SI, HI, AVH.
Pt is a 36 yo M, single, employed, with a history of major depressive disorder. He presented to the ED with worsening depression and suicidal ideation in the context of having run out of his medication for about 1 month. Pt states that his current mood is better but still low with prominent anhedonia and low energy. Pt is currently denying SI, HI, AVH.
Pt is a 36 yo M, single, employed, with a history of major depressive disorder. He presented to the ED with worsening depression and suicidal ideation in the context of having run out of his medication for about 1 month. Pt states that his current mood is better but still low with prominent anhedonia and low energy. Pt is currently denying SI, HI, AVH.
Pt is a 38 yo M, single, employed, with a history of major depressive disorder. He presented to the ED with worsening depression and suicidal ideation in the context of having run out of his medication for about 1 month. Pt states that his current mood is better but still low with prominent anhedonia and low energy. Pt is currently denying SI, HI, AVH.

## 2018-05-26 NOTE — PROGRESS NOTE BEHAVIORAL HEALTH - NSBHADMITDANGERSELF_PSY_A_CORE
suicidal ideation with plan and means

## 2018-05-26 NOTE — PROGRESS NOTE BEHAVIORAL HEALTH - RISK ASSESSMENT
Modifiable risk factors include low mood and medication non-compliance. Pt is at higher risk due to his mental illness. Pt exhibits protective factors such as being engaged in his own care, seeking an environment of safety, and being supported by a family with whom he is committed to.

## 2018-05-26 NOTE — PROGRESS NOTE BEHAVIORAL HEALTH - PROBLEM SELECTOR PLAN 1
continue Abilify and Celexa  continue group and milieu therapy
continue Abilify and Celexa  continue group and milieu therapy
continue Abilify and increase Celexa to 40mg PO Qdaily  continue group and milieu therapy
continue Abilify and increase Celexa to 40mg PO Qdaily  continue group and milieu therapy

## 2018-05-26 NOTE — PROGRESS NOTE BEHAVIORAL HEALTH - NSBHADMITIPOBS_PSY_A_CORE
Enhanced supervision
Enhanced supervision
Constant observation
Enhanced supervision

## 2018-05-26 NOTE — PROGRESS NOTE BEHAVIORAL HEALTH - NSBHADMITIPBHPROVFT_PSY_A_CORE
outpatient provider contacted
outpatient provider contacted
Dr Galvan
outpatient provider contacted

## 2018-05-27 RX ADMIN — ARIPIPRAZOLE 5 MILLIGRAM(S): 15 TABLET ORAL at 08:18

## 2018-05-27 RX ADMIN — CITALOPRAM 40 MILLIGRAM(S): 10 TABLET, FILM COATED ORAL at 08:17

## 2018-05-28 RX ORDER — MIRTAZAPINE 45 MG/1
15 TABLET, ORALLY DISINTEGRATING ORAL ONCE
Qty: 0 | Refills: 0 | Status: COMPLETED | OUTPATIENT
Start: 2018-05-28 | End: 2018-05-28

## 2018-05-28 RX ADMIN — Medication 30 MILLILITER(S): at 06:56

## 2018-05-28 RX ADMIN — MIRTAZAPINE 15 MILLIGRAM(S): 45 TABLET, ORALLY DISINTEGRATING ORAL at 23:26

## 2018-05-28 RX ADMIN — ARIPIPRAZOLE 5 MILLIGRAM(S): 15 TABLET ORAL at 08:05

## 2018-05-28 RX ADMIN — CITALOPRAM 40 MILLIGRAM(S): 10 TABLET, FILM COATED ORAL at 08:05

## 2018-05-28 NOTE — PROGRESS NOTE BEHAVIORAL HEALTH - AFFECT QUALITY
Depressed/Anxious
Anxious/Depressed
Depressed/Anxious
Depressed/Anxious
Anxious/Depressed
Depressed/Anxious

## 2018-05-28 NOTE — PROGRESS NOTE BEHAVIORAL HEALTH - NSBHFUPINTERVALHXFT_PSY_A_CORE
Pt seen and assessed in ER holding area. Pt on one to one sit and per sitter, pt has been sleeping peacefully for the entire morning. Pt encountered in bed sleeping but readily wakes to verbal stimuli. Pt reports that he is "catching up on sleep", denies thoughts of self harm. Understands that he will likely not get IPP placement until Monday. Denies complaints and has no questions.
He reported that he has started to feel better. He denies SI, HI, AVH. He reports that he continues to have low motivation for going back to work.   He is compliant with medications , no acute event overnight
Pt was seen and evaluated in treatment team. He reported having suicidal thoughts which led him to go to the ED. He stated that he had run out of his medication (Celexa and Abilify) for about one month during which time his mood worsened and he began to fear that he might harm himself as he has done in the past. Pt states that he currently does not have any thoughts of suicide. He states that his mood is still low and he does not have any motivation to "do anything." He states that he would like to achieve remission in the anhedonia prior to being discharged. He states he is willing to continue Celexa and Abilify. He denies any HI, AVH.
Pt was seen and evaluated on the unit. He reported that he has started to feel better. He denies SI, HI, AVH. He reports that he continues to have low motivation for going back to work. He also reported being berated by his father for not going to work. He states that he is worried that the pressures of home and work will cause him start thinking about suicide again. He reports feeling safe on the unit. He denies any appetite or sleep disturbances.
Pt was seen, discussed with staff  He reported that he has started to feel better. He denies SI, HI, AVH. He reports that he continues to have low motivation for going back to work.   He is compliant with medications , no acute event overnight
Pt was seen, discussed with staff  He reported that he has started to feel better. He denies SI, HI, AVH. He reports that he continues to have low motivation for going back to work.   He is compliant with medications , no acute event overnight
Pt was seen and evaluated on the unit. He reports that he has improved and would like to return home. He is unable to express how his mood is improved. He denies any SI, HI, AVH. Pt did require a prn ativan for anxiety and he reports ambivalence about his level of motivation to return to work.   Pt's father was contacted: he reports that the Pt has not been able to function over the past 2 weeks. He reports the patient has not been bathing or eating/drinking. He states the Pt has stopped going to work and his living environment is uncharacteristically a "disaster." He states the patient also increased his use of marijuana. Pt's father has concerns that the Pt is seeking discharge for the purpose of seeking substances but may not be stable with regards to his mood.
Pt was seen and evaluated in his room. He reports feeling safe on the unit and being glad that he is here. He states he is noticing that his mood is improving slightly due to not feeling the stress of fearing for his life due to low mood. Pt is currently denying SI, HI, AHV. However, he fears decompensation if discharged. He reports continued low energy and difficulty concentrating. He denies sleep or appetite disturbances.

## 2018-05-28 NOTE — PROGRESS NOTE BEHAVIORAL HEALTH - PRIMARY DX
Severe episode of recurrent major depressive disorder, without psychotic features
Current severe episode of major depressive disorder without psychotic features without prior episode
Current severe episode of major depressive disorder without psychotic features without prior episode
Severe episode of recurrent major depressive disorder, without psychotic features
Recurrent major depressive disorder, in partial remission
Recurrent major depressive disorder, in partial remission
Severe episode of recurrent major depressive disorder, without psychotic features
Severe episode of recurrent major depressive disorder, without psychotic features

## 2018-05-28 NOTE — PROGRESS NOTE BEHAVIORAL HEALTH - NSBHCONSORIP_PSY_A_CORE
Inpatient Admission...
Consult...
Consult...
Inpatient Admission...

## 2018-05-28 NOTE — PROGRESS NOTE BEHAVIORAL HEALTH - THOUGHT CONTENT
Suicidality
Unremarkable

## 2018-05-28 NOTE — PROGRESS NOTE BEHAVIORAL HEALTH - NSBHFUPINTERVALCCFT_PSY_A_CORE
"I am alright".
"I am ok"
"I am ok"
"I just want to feel better"
"I'm glad I'm here, I wasn't thinking right"
"I'm feeling better"
"I'm getting better"

## 2018-05-28 NOTE — PROGRESS NOTE BEHAVIORAL HEALTH - NSBHPTASSESSDT_PSY_A_CORE
19-May-2018 13:14
22-May-2018 15:06
25-May-2018 15:23
26-May-2018 18:16
27-May-2018 18:36
28-May-2018 10:26
24-May-2018 15:58
23-May-2018 11:51

## 2018-05-29 VITALS
SYSTOLIC BLOOD PRESSURE: 152 MMHG | TEMPERATURE: 98 F | HEART RATE: 91 BPM | DIASTOLIC BLOOD PRESSURE: 73 MMHG | RESPIRATION RATE: 16 BRPM

## 2018-05-29 RX ORDER — CITALOPRAM 10 MG/1
1 TABLET, FILM COATED ORAL
Qty: 0 | Refills: 0 | COMMUNITY

## 2018-05-29 RX ORDER — ARIPIPRAZOLE 15 MG/1
1 TABLET ORAL
Qty: 30 | Refills: 0 | OUTPATIENT
Start: 2018-05-29 | End: 2018-06-27

## 2018-05-29 RX ORDER — CITALOPRAM 10 MG/1
1 TABLET, FILM COATED ORAL
Qty: 30 | Refills: 0 | OUTPATIENT
Start: 2018-05-29 | End: 2018-06-27

## 2018-05-29 RX ORDER — ARIPIPRAZOLE 15 MG/1
1 TABLET ORAL
Qty: 0 | Refills: 0 | COMMUNITY

## 2018-05-29 RX ORDER — CITALOPRAM 10 MG/1
1 TABLET, FILM COATED ORAL
Qty: 0 | Refills: 0 | COMMUNITY
Start: 2018-05-29

## 2018-05-29 RX ORDER — ARIPIPRAZOLE 15 MG/1
10 TABLET ORAL DAILY
Qty: 0 | Refills: 0 | Status: DISCONTINUED | OUTPATIENT
Start: 2018-05-30 | End: 2018-05-29

## 2018-05-29 RX ORDER — ARIPIPRAZOLE 15 MG/1
1 TABLET ORAL
Qty: 0 | Refills: 0 | COMMUNITY
Start: 2018-05-29

## 2018-05-29 RX ORDER — ARIPIPRAZOLE 15 MG/1
1 TABLET ORAL
Qty: 30 | Refills: 0 | OUTPATIENT
Start: 2018-05-29

## 2018-05-29 RX ADMIN — ARIPIPRAZOLE 5 MILLIGRAM(S): 15 TABLET ORAL at 08:55

## 2018-05-29 RX ADMIN — CITALOPRAM 40 MILLIGRAM(S): 10 TABLET, FILM COATED ORAL at 08:55

## 2018-05-29 NOTE — CHART NOTE - NSCHARTNOTEFT_GEN_A_CORE
patient is future oriented, willing to engage in outpatient treatment. He has bright affect. Spoke with father who is comfortable with patient coming home. He is discharged today due to an ill grandmother

## 2018-05-29 NOTE — DISCHARGE NOTE BEHAVIORAL HEALTH - NSBHDCRESPONSEFT_PSY_A_CORE
patient became future oriented, expressed desire to take care of his son. He has bright affect on the unit, participates in DBT actively,

## 2018-05-29 NOTE — DISCHARGE NOTE BEHAVIORAL HEALTH - MEDICATION SUMMARY - MEDICATIONS TO TAKE
I will START or STAY ON the medications listed below when I get home from the hospital:    CeleXA 40 mg oral tablet  -- 1 tab(s) by mouth once a day  -- Indication: For Depression    ARIPiprazole 10 mg oral tablet  -- 1 tab(s) by mouth once a day  -- Indication: For Depression

## 2018-05-29 NOTE — CHART NOTE - NSCHARTNOTEFT_GEN_A_CORE
Social work discharge note:    PT is alert and oriented x3, denies homicidal or suicidal ideation- plan or intent, no psychosis noted. PT referred back to [facility] for out patient psychiatric follow up, 6/4/18 with Dr. Champion. MD Tavera spoke to PT's father prior to discharge, he is in agreement with DC plan and has no concerns.     will follow-up with [facility] after discharge.

## 2018-05-29 NOTE — PROGRESS NOTE ADULT - SUBJECTIVE AND OBJECTIVE BOX
pt stable alert in NAD  no new complaints    DEPRESSION  ^PANIC ATTACK  No h/o HF  No pertinent family history in first degree relatives  Handoff  MEWS Score  Vertigo  Depression  No pertinent past medical history  Depression  Recurrent major depressive disorder, in partial remission  Depression  Severe episode of recurrent major depressive disorder, without psychotic features  Current severe episode of major depressive disorder without psychotic features without prior episode  History of inguinal hernia repair  Abdominal hernia  No significant past surgical history  PANIC ATTACK  4    HEALTH ISSUES - PROBLEM Dx:  Recurrent major depressive disorder, in partial remission: Recurrent major depressive disorder, in partial remission  Depression: Depression  Severe episode of recurrent major depressive disorder, without psychotic features  Current severe episode of major depressive disorder without psychotic features without prior episode: Current severe episode of major depressive disorder without psychotic features without prior episode        PAST MEDICAL & SURGICAL HISTORY:  Vertigo  Depression  History of inguinal hernia repair: Left    No Known Drug Allergies  strawberry (Rash)      FAMILY HISTORY:  No pertinent family history in first degree relatives      acetaminophen   Tablet. 650 milliGRAM(s) Oral every 6 hours PRN  aluminum hydroxide/magnesium hydroxide/simethicone Suspension 30 milliLiter(s) Oral every 6 hours PRN  ARIPiprazole 5 milliGRAM(s) Oral daily  citalopram 40 milliGRAM(s) Oral daily      T(C): 35.8 (05-29-18 @ 05:58), Max: 36.2 (05-28-18 @ 12:34)  HR: 77 (05-28-18 @ 17:05) (75 - 77)  BP: 109/63 (05-29-18 @ 05:58) (105/61 - 111/70)  RR: 18 (05-29-18 @ 05:58) (18 - 18)  SpO2: --    PE;  general:  stable no acute change from previous    Lungs:    Heart:    EXT:    Neuro:                          CAPILLARY BLOOD GLUCOSE

## 2018-05-29 NOTE — DISCHARGE NOTE BEHAVIORAL HEALTH - HPI (INCLUDE ILLNESS QUALITY, SEVERITY, DURATION, TIMING, CONTEXT, MODIFYING FACTORS, ASSOCIATED SIGNS AND SYMPTOMS)
pt came in with his friend. c/o feeling increasingly depressed for the past 1 month and medication treatment was interrupted due to change in doctors. he is currently f/u at [facility] opd by [facility] resident MD. Mood feels low , dysphoric, loss of interest poor focus concentration poor sleep for the past week. increasingly worried about full relapse in sx and not feeling safe with himself due to suicidal ideation and past attempt.  denies any drug or alcohol use. denies any panic episode. no manic sx. denies any psychotic sx. works two jobs lives with parents who is not very understanding or supportive. pt came in with his friend. c/o feeling increasingly depressed for the past 1 month and medication treatment was interrupted due to change in doctors. he is currently f/u at [facility] opd by Akira person MD. Mood feels low , dysphoric, loss of interest poor focus concentration poor sleep for the past week. increasingly worried about full relapse in sx and not feeling safe with himself due to suicidal ideation and past attempt.  denies any drug or alcohol use. denies any panic episode. no manic sx. denies any psychotic sx. works two jobs lives with parents who is not very understanding or supportive.

## 2018-05-31 DIAGNOSIS — R45.851 SUICIDAL IDEATIONS: ICD-10-CM

## 2018-05-31 DIAGNOSIS — F32.2 MAJOR DEPRESSIVE DISORDER, SINGLE EPISODE, SEVERE WITHOUT PSYCHOTIC FEATURES: ICD-10-CM

## 2018-05-31 DIAGNOSIS — F32.9 MAJOR DEPRESSIVE DISORDER, SINGLE EPISODE, UNSPECIFIED: ICD-10-CM

## 2018-05-31 DIAGNOSIS — R42 DIZZINESS AND GIDDINESS: ICD-10-CM

## 2018-06-11 PROBLEM — R42 DIZZINESS AND GIDDINESS: Chronic | Status: ACTIVE | Noted: 2018-05-18

## 2018-06-11 PROBLEM — F32.9 MAJOR DEPRESSIVE DISORDER, SINGLE EPISODE, UNSPECIFIED: Chronic | Status: ACTIVE | Noted: 2018-05-18

## 2018-06-29 ENCOUNTER — OUTPATIENT (OUTPATIENT)
Dept: OUTPATIENT SERVICES | Facility: HOSPITAL | Age: 38
LOS: 1 days | Discharge: HOME | End: 2018-06-29

## 2018-06-29 DIAGNOSIS — Z98.890 OTHER SPECIFIED POSTPROCEDURAL STATES: Chronic | ICD-10-CM

## 2018-06-29 DIAGNOSIS — F33.2 MAJOR DEPRESSIVE DISORDER, RECURRENT SEVERE WITHOUT PSYCHOTIC FEATURES: ICD-10-CM

## 2018-07-16 ENCOUNTER — OUTPATIENT (OUTPATIENT)
Dept: OUTPATIENT SERVICES | Facility: HOSPITAL | Age: 38
LOS: 1 days | Discharge: HOME | End: 2018-07-16

## 2018-07-16 DIAGNOSIS — Z98.890 OTHER SPECIFIED POSTPROCEDURAL STATES: Chronic | ICD-10-CM

## 2018-07-16 DIAGNOSIS — F33.2 MAJOR DEPRESSIVE DISORDER, RECURRENT SEVERE WITHOUT PSYCHOTIC FEATURES: ICD-10-CM

## 2018-08-13 ENCOUNTER — OUTPATIENT (OUTPATIENT)
Dept: OUTPATIENT SERVICES | Facility: HOSPITAL | Age: 38
LOS: 1 days | Discharge: HOME | End: 2018-08-13

## 2018-08-13 DIAGNOSIS — F33.2 MAJOR DEPRESSIVE DISORDER, RECURRENT SEVERE WITHOUT PSYCHOTIC FEATURES: ICD-10-CM

## 2018-08-13 DIAGNOSIS — Z98.890 OTHER SPECIFIED POSTPROCEDURAL STATES: Chronic | ICD-10-CM

## 2018-09-11 ENCOUNTER — TRANSCRIPTION ENCOUNTER (OUTPATIENT)
Age: 38
End: 2018-09-11

## 2018-09-17 ENCOUNTER — OUTPATIENT (OUTPATIENT)
Dept: OUTPATIENT SERVICES | Facility: HOSPITAL | Age: 38
LOS: 1 days | Discharge: HOME | End: 2018-09-17

## 2018-09-17 DIAGNOSIS — Z98.890 OTHER SPECIFIED POSTPROCEDURAL STATES: Chronic | ICD-10-CM

## 2018-09-17 DIAGNOSIS — F33.2 MAJOR DEPRESSIVE DISORDER, RECURRENT SEVERE WITHOUT PSYCHOTIC FEATURES: ICD-10-CM

## 2019-01-01 ENCOUNTER — OUTPATIENT (OUTPATIENT)
Dept: OUTPATIENT SERVICES | Facility: HOSPITAL | Age: 39
LOS: 1 days | End: 2019-01-01
Payer: MEDICAID

## 2019-01-01 DIAGNOSIS — Z98.890 OTHER SPECIFIED POSTPROCEDURAL STATES: Chronic | ICD-10-CM

## 2019-01-01 PROCEDURE — G9001: CPT

## 2019-01-08 ENCOUNTER — TRANSCRIPTION ENCOUNTER (OUTPATIENT)
Age: 39
End: 2019-01-08

## 2019-01-11 ENCOUNTER — OUTPATIENT (OUTPATIENT)
Dept: OUTPATIENT SERVICES | Facility: HOSPITAL | Age: 39
LOS: 1 days | Discharge: HOME | End: 2019-01-11

## 2019-01-11 DIAGNOSIS — F33.2 MAJOR DEPRESSIVE DISORDER, RECURRENT SEVERE WITHOUT PSYCHOTIC FEATURES: ICD-10-CM

## 2019-01-11 DIAGNOSIS — Z98.890 OTHER SPECIFIED POSTPROCEDURAL STATES: Chronic | ICD-10-CM

## 2019-01-23 ENCOUNTER — INPATIENT (INPATIENT)
Facility: HOSPITAL | Age: 39
LOS: 6 days | Discharge: HOME | End: 2019-01-30
Attending: PSYCHIATRY & NEUROLOGY | Admitting: PSYCHIATRY & NEUROLOGY

## 2019-01-23 VITALS
TEMPERATURE: 98 F | SYSTOLIC BLOOD PRESSURE: 124 MMHG | OXYGEN SATURATION: 99 % | RESPIRATION RATE: 16 BRPM | DIASTOLIC BLOOD PRESSURE: 74 MMHG | HEART RATE: 68 BPM

## 2019-01-23 DIAGNOSIS — F12.20 CANNABIS DEPENDENCE, UNCOMPLICATED: ICD-10-CM

## 2019-01-23 DIAGNOSIS — F32.9 MAJOR DEPRESSIVE DISORDER, SINGLE EPISODE, UNSPECIFIED: ICD-10-CM

## 2019-01-23 DIAGNOSIS — T14.91XA SUICIDE ATTEMPT, INITIAL ENCOUNTER: ICD-10-CM

## 2019-01-23 DIAGNOSIS — Z98.890 OTHER SPECIFIED POSTPROCEDURAL STATES: Chronic | ICD-10-CM

## 2019-01-23 DIAGNOSIS — F33.2 MAJOR DEPRESSIVE DISORDER, RECURRENT SEVERE WITHOUT PSYCHOTIC FEATURES: ICD-10-CM

## 2019-01-23 LAB
ALBUMIN SERPL ELPH-MCNC: 4.5 G/DL — SIGNIFICANT CHANGE UP (ref 3.5–5.2)
ALP SERPL-CCNC: 109 U/L — SIGNIFICANT CHANGE UP (ref 30–115)
ALT FLD-CCNC: 20 U/L — SIGNIFICANT CHANGE UP (ref 0–41)
AMPHET UR-MCNC: NEGATIVE — SIGNIFICANT CHANGE UP
ANION GAP SERPL CALC-SCNC: 13 MMOL/L — SIGNIFICANT CHANGE UP (ref 7–14)
APAP SERPL-MCNC: <5 UG/ML — LOW (ref 10–30)
APPEARANCE UR: CLEAR — SIGNIFICANT CHANGE UP
AST SERPL-CCNC: 14 U/L — SIGNIFICANT CHANGE UP (ref 0–41)
BARBITURATES UR SCN-MCNC: NEGATIVE — SIGNIFICANT CHANGE UP
BASOPHILS # BLD AUTO: 0.04 K/UL — SIGNIFICANT CHANGE UP (ref 0–0.2)
BASOPHILS NFR BLD AUTO: 0.4 % — SIGNIFICANT CHANGE UP (ref 0–1)
BENZODIAZ UR-MCNC: NEGATIVE — SIGNIFICANT CHANGE UP
BILIRUB SERPL-MCNC: 0.2 MG/DL — SIGNIFICANT CHANGE UP (ref 0.2–1.2)
BILIRUB UR-MCNC: NEGATIVE — SIGNIFICANT CHANGE UP
BUN SERPL-MCNC: 9 MG/DL — LOW (ref 10–20)
CALCIUM SERPL-MCNC: 9.6 MG/DL — SIGNIFICANT CHANGE UP (ref 8.5–10.1)
CHLORIDE SERPL-SCNC: 100 MMOL/L — SIGNIFICANT CHANGE UP (ref 98–110)
CO2 SERPL-SCNC: 26 MMOL/L — SIGNIFICANT CHANGE UP (ref 17–32)
COCAINE METAB.OTHER UR-MCNC: NEGATIVE — SIGNIFICANT CHANGE UP
COLOR SPEC: YELLOW — SIGNIFICANT CHANGE UP
CREAT SERPL-MCNC: 1.1 MG/DL — SIGNIFICANT CHANGE UP (ref 0.7–1.5)
DIFF PNL FLD: ABNORMAL
DRUG SCREEN 1, URINE RESULT: SIGNIFICANT CHANGE UP
EOSINOPHIL # BLD AUTO: 0.38 K/UL — SIGNIFICANT CHANGE UP (ref 0–0.7)
EOSINOPHIL NFR BLD AUTO: 3.8 % — SIGNIFICANT CHANGE UP (ref 0–8)
ETHANOL SERPL-MCNC: <10 MG/DL — HIGH
GLUCOSE SERPL-MCNC: 224 MG/DL — HIGH (ref 70–99)
GLUCOSE UR QL: NEGATIVE MG/DL — SIGNIFICANT CHANGE UP
HCT VFR BLD CALC: 45.1 % — SIGNIFICANT CHANGE UP (ref 42–52)
HGB BLD-MCNC: 15.1 G/DL — SIGNIFICANT CHANGE UP (ref 14–18)
IMM GRANULOCYTES NFR BLD AUTO: 0.5 % — HIGH (ref 0.1–0.3)
KETONES UR-MCNC: NEGATIVE — SIGNIFICANT CHANGE UP
LEUKOCYTE ESTERASE UR-ACNC: NEGATIVE — SIGNIFICANT CHANGE UP
LYMPHOCYTES # BLD AUTO: 2.61 K/UL — SIGNIFICANT CHANGE UP (ref 1.2–3.4)
LYMPHOCYTES # BLD AUTO: 26.1 % — SIGNIFICANT CHANGE UP (ref 20.5–51.1)
MCHC RBC-ENTMCNC: 29.7 PG — SIGNIFICANT CHANGE UP (ref 27–31)
MCHC RBC-ENTMCNC: 33.5 G/DL — SIGNIFICANT CHANGE UP (ref 32–37)
MCV RBC AUTO: 88.6 FL — SIGNIFICANT CHANGE UP (ref 80–94)
METHADONE UR-MCNC: NEGATIVE — SIGNIFICANT CHANGE UP
MONOCYTES # BLD AUTO: 0.45 K/UL — SIGNIFICANT CHANGE UP (ref 0.1–0.6)
MONOCYTES NFR BLD AUTO: 4.5 % — SIGNIFICANT CHANGE UP (ref 1.7–9.3)
NEUTROPHILS # BLD AUTO: 6.46 K/UL — SIGNIFICANT CHANGE UP (ref 1.4–6.5)
NEUTROPHILS NFR BLD AUTO: 64.7 % — SIGNIFICANT CHANGE UP (ref 42.2–75.2)
NITRITE UR-MCNC: NEGATIVE — SIGNIFICANT CHANGE UP
NRBC # BLD: 0 /100 WBCS — SIGNIFICANT CHANGE UP (ref 0–0)
OPIATES UR-MCNC: NEGATIVE — SIGNIFICANT CHANGE UP
PCP UR-MCNC: NEGATIVE — SIGNIFICANT CHANGE UP
PH UR: 6 — SIGNIFICANT CHANGE UP (ref 5–8)
PLATELET # BLD AUTO: 246 K/UL — SIGNIFICANT CHANGE UP (ref 130–400)
POTASSIUM SERPL-MCNC: 3.9 MMOL/L — SIGNIFICANT CHANGE UP (ref 3.5–5)
POTASSIUM SERPL-SCNC: 3.9 MMOL/L — SIGNIFICANT CHANGE UP (ref 3.5–5)
PROPOXYPHENE QUALITATIVE URINE RESULT: NEGATIVE — SIGNIFICANT CHANGE UP
PROT SERPL-MCNC: 6.9 G/DL — SIGNIFICANT CHANGE UP (ref 6–8)
PROT UR-MCNC: NEGATIVE MG/DL — SIGNIFICANT CHANGE UP
RBC # BLD: 5.09 M/UL — SIGNIFICANT CHANGE UP (ref 4.7–6.1)
RBC # FLD: 12.1 % — SIGNIFICANT CHANGE UP (ref 11.5–14.5)
RBC CASTS # UR COMP ASSIST: ABNORMAL /HPF
SALICYLATES SERPL-MCNC: <0.3 MG/DL — LOW (ref 4–30)
SODIUM SERPL-SCNC: 139 MMOL/L — SIGNIFICANT CHANGE UP (ref 135–146)
SP GR SPEC: 1.01 — SIGNIFICANT CHANGE UP (ref 1.01–1.03)
THC UR QL: POSITIVE
UROBILINOGEN FLD QL: 0.2 MG/DL — SIGNIFICANT CHANGE UP (ref 0.2–0.2)
WBC # BLD: 9.99 K/UL — SIGNIFICANT CHANGE UP (ref 4.8–10.8)
WBC # FLD AUTO: 9.99 K/UL — SIGNIFICANT CHANGE UP (ref 4.8–10.8)
WBC UR QL: SIGNIFICANT CHANGE UP /HPF

## 2019-01-23 RX ORDER — CITALOPRAM 10 MG/1
40 TABLET, FILM COATED ORAL DAILY
Qty: 0 | Refills: 0 | Status: DISCONTINUED | OUTPATIENT
Start: 2019-01-23 | End: 2019-01-30

## 2019-01-23 RX ORDER — ACETAMINOPHEN 500 MG
650 TABLET ORAL EVERY 6 HOURS
Qty: 0 | Refills: 0 | Status: DISCONTINUED | OUTPATIENT
Start: 2019-01-23 | End: 2019-01-30

## 2019-01-23 RX ORDER — ARIPIPRAZOLE 15 MG/1
10 TABLET ORAL DAILY
Qty: 0 | Refills: 0 | Status: DISCONTINUED | OUTPATIENT
Start: 2019-01-23 | End: 2019-01-24

## 2019-01-23 RX ORDER — NICOTINE POLACRILEX 2 MG
2 GUM BUCCAL
Qty: 0 | Refills: 0 | Status: DISCONTINUED | OUTPATIENT
Start: 2019-01-23 | End: 2019-01-30

## 2019-01-23 RX ORDER — HYDROXYZINE HCL 10 MG
50 TABLET ORAL EVERY 6 HOURS
Qty: 0 | Refills: 0 | Status: DISCONTINUED | OUTPATIENT
Start: 2019-01-23 | End: 2019-01-24

## 2019-01-23 NOTE — ED BEHAVIORAL HEALTH ASSESSMENT NOTE - SUMMARY
37 yo SM w ho IPP stays, depressed mood with current SI. Pt has past history of significant suicide attempt 2 years ago, recent brief history of homelessness, substance use, financial stress, noncompliant with treatment. Pt has mult concerning risk factors for completion of suicide, will admit for observation.

## 2019-01-23 NOTE — ED BEHAVIORAL HEALTH ASSESSMENT NOTE - AXIS IV
Problem related to social environment/Problems with primary support/Occupational problems/Economic problems

## 2019-01-23 NOTE — ED PROVIDER NOTE - NS ED ROS FT
Review of Systems:  	•	CONSTITUTIONAL - no fever, no diaphoresis, no chills  	•	SKIN - no rash  	•	HEMATOLOGIC - no bleeding, no bruising  	•	EYES - no eye pain, no blurry vision  	•	ENT - no change in hearing, no sore throat, no ear pain or tinnitus  	•	RESPIRATORY - no shortness of breath, no cough  	•	CARDIAC - no chest pain, no palpitations  	•	GI - no abd pain, no nausea, no vomiting, no diarrhea, no constipation  	•	GENITO-URINARY - no discharge, no dysuria; no hematuria, no increased urinary frequency  	•	MUSCULOSKELETAL - no joint paint, no swelling, no redness  	•	NEUROLOGIC - no weakness, no headache, no paresthesias, no LOC  	•	PSYCH - no anxiety, suicidal, non homicidal, no hallucination, no depression

## 2019-01-23 NOTE — ED PROVIDER NOTE - PRINCIPAL DIAGNOSIS
Major depressive disorder with current active episode, unspecified depression episode severity, unspecified whether recurrent

## 2019-01-23 NOTE — H&P ADULT - NSHPPHYSICALEXAM_GEN_ALL_CORE
GENERAL:  37y/o Male NAD, resting comfortably.  HEAD:  Atraumatic, Normocephalic  EYES: EOMI, PERRLA, conjunctiva and sclera clear  NECK: Supple, No JVD, no cervical lymphadenopathy, non-tender  CHEST/LUNG: Clear to auscultation bilaterally; No wheeze, rhonchi, or rales  HEART: Regular rate and rhythm; S1&S2  ABDOMEN: Soft, Nontender, Nondistended x 4 quadrants; Bowel sounds present  EXTREMITIES:   Peripheral Pulses Present, No clubbing, no cyanosis, or no edema, no calf tenderness  PSYCH: AAOx3, cooperative, appropriate  NEUROLOGY: WNL  SKIN: WNL

## 2019-01-23 NOTE — ED BEHAVIORAL HEALTH ASSESSMENT NOTE - HPI (INCLUDE ILLNESS QUALITY, SEVERITY, DURATION, TIMING, CONTEXT, MODIFYING FACTORS, ASSOCIATED SIGNS AND SYMPTOMS)
37 yo SM, domiciled with father, past IPP say, past suicide attempt presents with suicidal ideation with friend. Pt states that for past 2.5 months he has been very depressed but for the past 3 days, he has had almost constant thoughts of harming himself but denies having a plan or intention to end his life. Pt depressed appearing providing minimal history.   Pt's best friend Yamilet provides further history: "I've been in rehab for the past few weeks and all the neighbors say he's been lost without me. For the past few days he's been really going through it. His family is always making really mean comments to him. Yesterday his cousin's wife was in labor and his dad, his stepmom, his brother all of them left to go to the hospital while she was in labor. And they all walked past him without saying anything and left. He was so upset. He was pacing and so angry. He's been going downhill. He told me that I would be better off, if he weren't alive. We've been trying to call around for therapist cause nothing's working but some people are not getting back to us".   Per outpt provider Dr Charles Galvan: Pt has a past history of depression and anxiety, he's had several IPP stays with last being May 2018 for low mood and SI without attempt. This was the time when pt was transitioning from one resident physician provider to Dr Galvan. After IPP, he ws doing well and adherent to medication regimen of Celexa 40 mg po qd and Abilify 10 mg po qd and presenting for therapy with Annamarie in addition to Dr Galvan. However, in the fall, his attendance fell off but after several phone calls to reingage him he presented on 1/11/19. During that visit, he described not wanting to return to his 2 jobs that he had prior to IPP admission. He was applying for SSD on the basis of mental health but feels frustrated that this is not being granted to him right away. Because he is not returning to work, he is having financial issues which is causing conflict with his father with whom he lives and was paying rent. As a result, he went to live in his car but recently returned to living home and is now on public assistance. During that visit he described increasing suicidal ideation as well as flashbacks of his prior suicide attempt in 2016 during which he cut his neck and required medical admission. Also of note, pt has been smoking mj daily.

## 2019-01-23 NOTE — H&P ADULT - HISTORY OF PRESENT ILLNESS
39 yo SM, domiciled with father, past IPP say, past suicide attempt presents with suicidal ideation with friend. Pt states that for past 2.5 months he has been very depressed but for the past 3 days, he has had almost constant thoughts of harming himself but denies having a plan or intention to end his life

## 2019-01-23 NOTE — ED PROVIDER NOTE - ATTENDING CONTRIBUTION TO CARE
38 year old male, pmhx depression, presenting with worsening depression and suicidal ideation. patient states he has attempted suicide in the past and was admitted at that time. States he has the same plan to cut himself and that he does not feel safe at home. Otherwise denies HI/hallucinations or other medical complaints.    Vital Signs: I have reviewed the initial vital signs.  Constitutional: NAD, well-nourished, appears stated age, no acute distress.  HEENT: Airway patent, moist MM, no erythema/swelling/deformity of oral structures. EOMI, PERRLA.  CV: regular rate, regular rhythm, well-perfused extremities, 2+ b/l DP and radial pulses equal.  Lungs: BCTA, no increased WOB.  ABD: NTND, no guarding or rebound, no pulsatile mass, no hernias.   MSK: Neck supple, nontender, nl ROM, no stepoff. Chest nontender. Back nontender in TLS spine or to b/l bony structures or flanks. Ext nontender, nl rom, no deformity.   INTEG: Skin warm, dry, no rash.  NEURO: A&Ox3, normal strength, nl sensation throughout, normal speech.   PSYCH: Calm, cooperative, normal affect and interaction.    Will call psych for consult, follow up recs.

## 2019-01-23 NOTE — ED ADULT NURSE NOTE - HPI (INCLUDE ILLNESS QUALITY, SEVERITY, DURATION, TIMING, CONTEXT, MODIFYING FACTORS, ASSOCIATED SIGNS AND SYMPTOMS)
SI for 3 days, pt reports felling stressed at home and dealing with depression for 2 months, pt goes to group therapy sessions, unable to find psychiatrist accepting new patients. pt has hx of suicide attempt in 2016, pt attempted to slit his throat. pt denies plan for suicide today, denies homicidal ideation.

## 2019-01-23 NOTE — ED PROVIDER NOTE - MEDICAL DECISION MAKING DETAILS
Patient presented with suicidal ideation - seen by psych in ED who requested admission to their service. Otherwise HD stable, without medical complaints. Medically cleared. Patient to be admitted to psych for further management.

## 2019-01-23 NOTE — ED PROVIDER NOTE - OBJECTIVE STATEMENT
this is 37 yo male presents to ed for evaluation. patient history of depression. patient stated he has been more depressed . patient has thoughts of hurting himself. patient states he has hurt himself in past and was admitted at that time. patient states he did not feel safe at home.

## 2019-01-23 NOTE — ED PROVIDER NOTE - CARE PLAN
Principal Discharge DX:	Major depressive disorder with current active episode, unspecified depression episode severity, unspecified whether recurrent

## 2019-01-23 NOTE — H&P ADULT - NSHPLABSRESULTS_GEN_ALL_CORE
Vital Signs Last 24 Hrs  T(C): 36.5 (2019 15:53), Max: 36.6 (2019 12:38)  T(F): 97.7 (2019 15:53), Max: 97.8 (2019 12:38)  HR: 72 (2019 15:53) (68 - 72)  BP: 111/65 (2019 15:53) (111/65 - 124/74)  BP(mean): --  RR: 16 (2019 15:53) (16 - 16)  SpO2: 98% (2019 15:53) (98% - 99%)                        15.1   9.99  )-----------( 246      ( 2019 15:45 )             45.1           139  |  100  |  9<L>  ----------------------------<  224<H>  3.9   |  26  |  1.1    Ca    9.6      2019 15:45    TPro  6.9  /  Alb  4.5  /  TBili  0.2  /  DBili  x   /  AST  14  /  ALT  20  /  AlkPhos  109                Urinalysis Basic - ( 2019 15:45 )    Color: Yellow / Appearance: Clear / S.010 / pH: x  Gluc: x / Ketone: Negative  / Bili: Negative / Urobili: 0.2 mg/dL   Blood: x / Protein: Negative mg/dL / Nitrite: Negative   Leuk Esterase: Negative / RBC: 3-5 /HPF / WBC 1-2 /HPF   Sq Epi: x / Non Sq Epi: x / Bacteria: x            Lactate Trend            CAPILLARY BLOOD GLUCOSE

## 2019-01-24 DIAGNOSIS — Z71.89 OTHER SPECIFIED COUNSELING: ICD-10-CM

## 2019-01-24 RX ORDER — HALOPERIDOL DECANOATE 100 MG/ML
5 INJECTION INTRAMUSCULAR EVERY 6 HOURS
Qty: 0 | Refills: 0 | Status: DISCONTINUED | OUTPATIENT
Start: 2019-01-24 | End: 2019-01-30

## 2019-01-24 RX ORDER — METFORMIN HYDROCHLORIDE 850 MG/1
500 TABLET ORAL
Qty: 0 | Refills: 0 | Status: DISCONTINUED | OUTPATIENT
Start: 2019-01-24 | End: 2019-01-30

## 2019-01-24 RX ORDER — HYDROXYZINE HCL 10 MG
50 TABLET ORAL EVERY 6 HOURS
Qty: 0 | Refills: 0 | Status: DISCONTINUED | OUTPATIENT
Start: 2019-01-24 | End: 2019-01-30

## 2019-01-24 RX ORDER — ARIPIPRAZOLE 15 MG/1
15 TABLET ORAL DAILY
Qty: 0 | Refills: 0 | Status: DISCONTINUED | OUTPATIENT
Start: 2019-01-24 | End: 2019-01-25

## 2019-01-24 RX ADMIN — ARIPIPRAZOLE 10 MILLIGRAM(S): 15 TABLET ORAL at 08:35

## 2019-01-24 RX ADMIN — METFORMIN HYDROCHLORIDE 500 MILLIGRAM(S): 850 TABLET ORAL at 20:05

## 2019-01-24 RX ADMIN — Medication 650 MILLIGRAM(S): at 10:43

## 2019-01-24 RX ADMIN — CITALOPRAM 40 MILLIGRAM(S): 10 TABLET, FILM COATED ORAL at 08:35

## 2019-01-24 RX ADMIN — Medication 650 MILLIGRAM(S): at 09:29

## 2019-01-24 NOTE — PROGRESS NOTE BEHAVIORAL HEALTH - NSBHFUPADDHPIFT_PSY_A_CORE
Collateral obtained from Dr. Champion: Patient has been on Abilify since his discharge in 2016. There is no known manic episode, and Abilify is likely to augment SSRI for his low mood. Patient also reported to be intermittently compliant with outpatient appointments, especially his psychotherapy, due to which his chart was closed. Patient reports feeling low, endorsing poor sleep and appetite, for at least 3 months now with development of passive suicidal ideations for the past 3-4 days. He denies any intent or plan, but states he was having feelings of "I shouldn't be alive". He also reports having flashbacks of him slitting his throat in 2016 in a suicide attempts, but denies having them since his admission. He attributes his low mood to lack of support from his family, which he mentions several times during the interview. He does report some improvement in his mood as he's had improved sleep on the unit, but continues to feel depressed. Denies suicidal ideations on evaluation today.     Denies any history of hypomania or angelica, denies symptoms of psychosis.      Collateral obtained from Dr. Champion: Patient has been on Abilify since his discharge in 2016. There is no known manic episode, and Abilify is likely to augment SSRI for his low mood. Patient also reported to be intermittently compliant with outpatient appointments, especially his psychotherapy, due to which his chart was closed.     Medications confirmed with [facility] pharmacy ([facility]):   Abilify 15mg PO QHS  Celexa 40mg PO   Metformin 500 mg PO BID

## 2019-01-24 NOTE — PROGRESS NOTE BEHAVIORAL HEALTH - NSBHCHARTREVIEWLAB_PSY_A_CORE FT
Complete Blood Count + Automated Diff (01.23.19 @ 15:45)    WBC Count: 9.99 K/uL    RBC Count: 5.09 M/uL    Hemoglobin: 15.1 g/dL    Hematocrit: 45.1 %    Mean Cell Volume: 88.6 fL    Mean Cell Hemoglobin: 29.7 pg    Mean Cell Hemoglobin Conc: 33.5 g/dL    Red Cell Distrib Width: 12.1 %    Platelet Count - Automated: 246 K/uL    Auto Neutrophil #: 6.46 K/uL    Auto Lymphocyte #: 2.61 K/uL    Auto Monocyte #: 0.45 K/uL    Auto Eosinophil #: 0.38 K/uL    Auto Basophil #: 0.04 K/uL    Auto Neutrophil %: 64.7: Differential percentages must be correlated with absolute numbers for  clinical significance. %    Auto Lymphocyte %: 26.1 %    Auto Monocyte %: 4.5 %    Auto Eosinophil %: 3.8 %    Auto Basophil %: 0.4 %    Auto Immature Granulocyte %: 0.5 %

## 2019-01-24 NOTE — CHART NOTE - NSCHARTNOTEFT_GEN_A_CORE
Social Work Admission Note:    Patient Bladimir Donaldson is a 38 year old male who presents to the ED with suicidal ideation, and had a recent suicide attempt that warranted concern for his safety and psychiatric evaluation. Patient stated that for the past two and a half months, he has experienced an increase in depression but for the past three days he has had frequent thoughts of wanting to cause harm to himself, but he denies plan or intent to carry out suicide. Patient's friend was able to provide additional history, stating that patient has complex family issues and that he was feeling agitated around his family. Patient was admitted for continued observation, medication management, and psychiatric stabilization.    Patient has previous psychiatric history of IPP admissions, most recently in May 2018. He is treated by Dr. Galvan as an outpatient, and had been compliant with treatment and medications until his attendance began to decline at the end of 2018. Patient continued to present with low mood and had difficulty managing mental illness. He reports no other significant psychiatric concerns, but does endorse substance use, and smoking. He does not report any other medical complications.     Patient has a history of homelessness but is now domiciled with his father. He has applied for SSD on the basis of mental health, and reportedly did not want to return back to work due to mental illness. He reports psychosocial stressors, poor social supports, difficulty with finances, and difficulty in complying with treatment.     Patient will remain on unit for continued observation, treatment and will receive behavioral health services on unit. Patient will be discharged to appropriate level of care upon discharge. Please see SW Psychosocial for additional information.

## 2019-01-24 NOTE — CONSULT NOTE ADULT - SUBJECTIVE AND OBJECTIVE BOX
MANDIDARREN REED  38y  Male      Patient is a 38y old  Male who presents with a chief complaint of Pt had suicidal ideations with no plan for few days (2019 23:43)    HPI:  37 yo SM, domiciled with father, past IPP say, past suicide attempt presents with suicidal ideation with friend. Pt states that for past 2.5 months he has been very depressed but for the past 3 days, he has had almost constant thoughts of harming himself but denies having a plan or intention to end his life (2019 17:01)    INTERVAL HPI/OVERNIGHT EVENTS:  HEALTH ISSUES - PROBLEM Dx:  Depression: Depression  Suicide attempt: Suicide attempt  Major depressive disorder with current active episode, unspecified depression episode severity, unspecified whether recurrent: Major depressive disorder with current active episode, unspecified depression episode severity, unspecified whether recurrent  Cannabis dependence  Severe episode of recurrent major depressive disorder, without psychotic features        PAST MEDICAL & SURGICAL HISTORY:  Suicide attempt  Vertigo  Depression  History of inguinal hernia repair: Left    FAMILY HISTORY:  No pertinent family history in first degree relatives    acetaminophen   Tablet .. 650 milliGRAM(s) Oral every 6 hours PRN  aluminum hydroxide/magnesium hydroxide/simethicone Suspension 30 milliLiter(s) Oral every 4 hours PRN  ARIPiprazole 10 milliGRAM(s) Oral daily  citalopram 40 milliGRAM(s) Oral daily  hydrOXYzine hydrochloride 50 milliGRAM(s) Oral every 6 hours PRN  LORazepam     Tablet 1 milliGRAM(s) Oral every 6 hours PRN  nicotine  Polacrilex Gum 2 milliGRAM(s) Oral every 3 hours PRN      REVIEW OF SYSTEMS:  CONSTITUTIONAL: No fever, weight loss, or fatigue  EYES: No eye pain, visual disturbances, or discharge  ENMT:  No difficulty hearing, tinnitus, vertigo; No sinus or throat pain  NECK: No pain or stiffness  BREASTS: No pain, masses, or nipple discharge  RESPIRATORY: No cough, wheezing, chills or hemoptysis; No shortness of breath  CARDIOVASCULAR: No chest pain, palpitations, dizziness, or leg swelling  GASTROINTESTINAL: No abdominal or epigastric pain. No nausea, vomiting, or hematemesis; No diarrhea or constipation. No melena or hematochezia.  GENITOURINARY: No dysuria, frequency, hematuria, or incontinence  NEUROLOGICAL: No headaches, memory loss, loss of strength, numbness, or tremors  SKIN: No itching, burning, rashes, or lesions   LYMPH NODES: No enlarged glands  ENDOCRINE: No heat or cold intolerance; No hair loss  MUSCULOSKELETAL: No joint pain or swelling; No muscle, back, or extremity pain  PSYCHIATRIC: as per hpi and previous psych history  HEME/LYMPH: No easy bruising, or bleeding gums  ALLERY AND IMMUNOLOGIC: No hives or eczema    T(C): 36.4 (19 @ 06:12), Max: 36.7 (19 @ 20:00)  HR: 80 (19 @ 06:12) (68 - 80)  BP: 113/62 (19 @ 06:12) (111/65 - 124/74)  RR: 18 (19 @ 06:12) (16 - 18)  SpO2: 98% (19 @ 15:53) (98% - 99%)  Wt(kg): --Vital Signs Last 24 Hrs  T(C): 36.4 (2019 06:12), Max: 36.7 (2019 20:00)  T(F): 97.5 (2019 06:12), Max: 98 (2019 20:00)  HR: 80 (2019 06:12) (68 - 80)  BP: 113/62 (2019 06:12) (111/65 - 124/74)  BP(mean): --  RR: 18 (2019 06:12) (16 - 18)  SpO2: 98% (2019 15:53) (98% - 99%)    PHYSICAL EXAM:  GENERAL: NAD,well-developed  HEAD:  Atraumatic, Normocephalic  EYES: EOMI, PERRLA, conjunctiva and sclera clear  ENMT: No tonsillar erythema, exudates, or enlargement; Moist mucous membranes, Good dentition, No lesions  NECK: Supple, No JVD, Normal thyroid  NERVOUS SYSTEM:  Alert & Oriented X3, Good concentration; Motor Strength 5/5 B/L upper and lower extremities; DTRs 2+ intact and symmetric  CHEST/LUNG: Clear bs bilaterally; No rales, rhonchi, wheezing  HEART: Regular rate and rhythm; No murmurs, rubs, or gallops  ABDOMEN: Soft, Nontender, Nondistended; Bowel sounds present  EXTREMITIES:  2+ Peripheral Pulses, No clubbing, cyanosis, or edema  LYMPH: No lymphadenopathy noted  SKIN: No rashes or lesions  Neuro: alert  no focal deficits    Consultant(s) Notes Reviewed:  [x ] YES  [ ] NO  Care Discussed with Consultants/Other Providers [ x] YES  [ ] NO    LABS:                        15.1   9.99  )-----------( 246      ( 2019 15:45 )             45.1         139  |  100  |  9<L>  ----------------------------<  224<H>  3.9   |  26  |  1.1    Ca    9.6      2019 15:45    TPro  6.9  /  Alb  4.5  /  TBili  0.2  /  DBili  x   /  AST  14  /  ALT  20  /  AlkPhos  109        Urinalysis Basic - ( 2019 15:45 )    Color: Yellow / Appearance: Clear / S.010 / pH: x  Gluc: x / Ketone: Negative  / Bili: Negative / Urobili: 0.2 mg/dL   Blood: x / Protein: Negative mg/dL / Nitrite: Negative   Leuk Esterase: Negative / RBC: 3-5 /HPF / WBC 1-2 /HPF   Sq Epi: x / Non Sq Epi: x / Bacteria: x      CAPILLARY BLOOD GLUCOSE            Urinalysis Basic - ( 2019 15:45 )    Color: Yellow / Appearance: Clear / S.010 / pH: x  Gluc: x / Ketone: Negative  / Bili: Negative / Urobili: 0.2 mg/dL   Blood: x / Protein: Negative mg/dL / Nitrite: Negative   Leuk Esterase: Negative / RBC: 3-5 /HPF / WBC 1-2 /HPF   Sq Epi: x / Non Sq Epi: x / Bacteria: x        RADIOLOGY & ADDITIONAL TESTS:    Imaging Personally Reviewed:  [ ] YES  [ ] NO

## 2019-01-24 NOTE — PROGRESS NOTE BEHAVIORAL HEALTH - NSBHADDHXPSYCHFT_PSY_A_CORE
3 prior IPP admissions, most recent in May 2018. One prior suicide attempt in 2016 when he slit his throat. Follows with Dr. Akira quinonez 4 prior IPP admissions, most recent in May 2018. One prior suicide attempt in 2016 when he slit his throat. Follows with Dr. Akira quinonez

## 2019-01-25 RX ORDER — ARIPIPRAZOLE 15 MG/1
15 TABLET ORAL AT BEDTIME
Qty: 0 | Refills: 0 | Status: DISCONTINUED | OUTPATIENT
Start: 2019-01-26 | End: 2019-01-30

## 2019-01-25 RX ORDER — DOCUSATE SODIUM 100 MG
100 CAPSULE ORAL
Qty: 0 | Refills: 0 | Status: DISCONTINUED | OUTPATIENT
Start: 2019-01-25 | End: 2019-01-30

## 2019-01-25 RX ORDER — ARIPIPRAZOLE 15 MG/1
15 TABLET ORAL AT BEDTIME
Qty: 0 | Refills: 0 | Status: DISCONTINUED | OUTPATIENT
Start: 2019-01-25 | End: 2019-01-25

## 2019-01-25 RX ADMIN — METFORMIN HYDROCHLORIDE 500 MILLIGRAM(S): 850 TABLET ORAL at 20:39

## 2019-01-25 RX ADMIN — METFORMIN HYDROCHLORIDE 500 MILLIGRAM(S): 850 TABLET ORAL at 08:10

## 2019-01-25 RX ADMIN — Medication 100 MILLIGRAM(S): at 20:39

## 2019-01-25 RX ADMIN — Medication 50 MILLIGRAM(S): at 20:39

## 2019-01-25 RX ADMIN — CITALOPRAM 40 MILLIGRAM(S): 10 TABLET, FILM COATED ORAL at 08:10

## 2019-01-25 RX ADMIN — ARIPIPRAZOLE 15 MILLIGRAM(S): 15 TABLET ORAL at 08:10

## 2019-01-25 NOTE — PROGRESS NOTE BEHAVIORAL HEALTH - NSBHFUPINTERVALHXFT_PSY_A_CORE
Patient evaluated at bedside, chart reviewed. Patient reports improved sleep last night, stating he slept "enough" with a smile. Reports his appetite is intact, but does report some constipation (last BM 3 days ago). Reports his mood is "clear", and denies any thoughts of self harm. He states his mood became low due to the "negativity at home", referring to minimal support from family, but states that he's looking forward to [facility], attending groups and agrees to comply with outpatient therapy appointments to help him cope with the stress at home, with long term plan of supportive housing.     No behavioral issues on the unit, remains compliant with medications. Spoke to his father yesterday, and states conversation went well and he is okay to return to his house. Also reports his friend, Yamilet (who brought him to the hospital) will be visiting later.

## 2019-01-25 NOTE — PROGRESS NOTE ADULT - SUBJECTIVE AND OBJECTIVE BOX
pt stable alert in NAD  no new complaints    MAJOR DEPRESSIVE DISORDER  ^PSYCH  No pertinent family history in first degree relatives  Handoff  MEWS Score  Suicide attempt  Vertigo  Depression  No pertinent past medical history  Major depressive disorder with current active episode, unspecified depression episode severity, unspecified whether recurrent  Depression  Suicide attempt  Major depressive disorder with current active episode, unspecified depression episode severity, unspecified whether recurrent  Cannabis dependence  Severe episode of recurrent major depressive disorder, without psychotic features  History of inguinal hernia repair  Abdominal hernia  No significant past surgical history  PSYCH  90+    HEALTH ISSUES - PROBLEM Dx:  Depression: Depression  Suicide attempt: Suicide attempt  Major depressive disorder with current active episode, unspecified depression episode severity, unspecified whether recurrent: Major depressive disorder with current active episode, unspecified depression episode severity, unspecified whether recurrent  Cannabis dependence  Severe episode of recurrent major depressive disorder, without psychotic features        PAST MEDICAL & SURGICAL HISTORY:  Suicide attempt  Vertigo  Depression  History of inguinal hernia repair: Left    No Known Drug Allergies  strawberry (Rash)      FAMILY HISTORY:  No pertinent family history in first degree relatives      acetaminophen   Tablet .. 650 milliGRAM(s) Oral every 6 hours PRN  aluminum hydroxide/magnesium hydroxide/simethicone Suspension 30 milliLiter(s) Oral every 4 hours PRN  ARIPiprazole 15 milliGRAM(s) Oral daily  citalopram 40 milliGRAM(s) Oral daily  haloperidol     Tablet 5 milliGRAM(s) Oral every 6 hours PRN  hydrOXYzine hydrochloride 50 milliGRAM(s) Oral every 6 hours PRN  metFORMIN 500 milliGRAM(s) Oral two times a day  nicotine  Polacrilex Gum 2 milliGRAM(s) Oral every 3 hours PRN      T(C): 36 (01-25-19 @ 06:05), Max: 36.5 (01-24-19 @ 09:22)  HR: 72 (01-25-19 @ 06:05) (71 - 73)  BP: 131/70 (01-25-19 @ 06:05) (108/72 - 131/70)  RR: 17 (01-25-19 @ 06:05) (17 - 18)  SpO2: --    PE;  general:  no acute cahnges from previosu    Lungs:    Heart:    EXT:    Neuro: no deficista alert      15.1  45.1  9.99  9  1.1  3.9  224      01-23    139  |  100  |  9<L>  ----------------------------<  224<H>  3.9   |  26  |  1.1    Ca    9.6      23 Jan 2019 15:45    TPro  6.9  /  Alb  4.5  /  TBili  0.2  /  DBili  x   /  AST  14  /  ALT  20  /  AlkPhos  109  01-23      LIVER FUNCTIONS - ( 23 Jan 2019 15:45 )  Alb: 4.5 g/dL / Pro: 6.9 g/dL / ALK PHOS: 109 U/L / ALT: 20 U/L / AST: 14 U/L / GGT: x                                   15.1   9.99  )-----------( 246      ( 23 Jan 2019 15:45 )             45.1       CAPILLARY BLOOD GLUCOSE

## 2019-01-26 RX ADMIN — CITALOPRAM 40 MILLIGRAM(S): 10 TABLET, FILM COATED ORAL at 07:43

## 2019-01-26 RX ADMIN — METFORMIN HYDROCHLORIDE 500 MILLIGRAM(S): 850 TABLET ORAL at 08:23

## 2019-01-26 RX ADMIN — METFORMIN HYDROCHLORIDE 500 MILLIGRAM(S): 850 TABLET ORAL at 20:30

## 2019-01-26 RX ADMIN — ARIPIPRAZOLE 15 MILLIGRAM(S): 15 TABLET ORAL at 20:30

## 2019-01-26 NOTE — PROGRESS NOTE BEHAVIORAL HEALTH - NSBHFUPINTERVALHXFT_PSY_A_CORE
Patient evaluated at bedside, chart reviewed. Patient reports improved sleep last night, refused colace  states that he passed bowel . Reports his mood is "better , and denies any thoughts of self harm. No behavioral issues on the unit, remains compliant with medications.  He is cooperative in his treatment plan

## 2019-01-27 RX ADMIN — METFORMIN HYDROCHLORIDE 500 MILLIGRAM(S): 850 TABLET ORAL at 08:21

## 2019-01-27 RX ADMIN — CITALOPRAM 40 MILLIGRAM(S): 10 TABLET, FILM COATED ORAL at 08:21

## 2019-01-27 RX ADMIN — ARIPIPRAZOLE 15 MILLIGRAM(S): 15 TABLET ORAL at 20:08

## 2019-01-27 RX ADMIN — METFORMIN HYDROCHLORIDE 500 MILLIGRAM(S): 850 TABLET ORAL at 20:08

## 2019-01-27 NOTE — PROGRESS NOTE BEHAVIORAL HEALTH - NSBHFUPINTERVALHXFT_PSY_A_CORE
Patient seen and evaluated currently cooperative and coherent.  He reported that he is "Feeling much better," denies severe depression and denies any suicidal or homicidal ideation at present responsive to staff's verbal redirection.

## 2019-01-28 RX ADMIN — CITALOPRAM 40 MILLIGRAM(S): 10 TABLET, FILM COATED ORAL at 08:05

## 2019-01-28 RX ADMIN — ARIPIPRAZOLE 15 MILLIGRAM(S): 15 TABLET ORAL at 20:03

## 2019-01-28 RX ADMIN — METFORMIN HYDROCHLORIDE 500 MILLIGRAM(S): 850 TABLET ORAL at 20:03

## 2019-01-28 RX ADMIN — Medication 100 MILLIGRAM(S): at 08:05

## 2019-01-28 RX ADMIN — METFORMIN HYDROCHLORIDE 500 MILLIGRAM(S): 850 TABLET ORAL at 08:05

## 2019-01-28 NOTE — PROGRESS NOTE BEHAVIORAL HEALTH - NSBHFUPINTERVALHXFT_PSY_A_CORE
Patient observed ambulating beach, dressed in personal clothes, noted to be well groomed. Cooperative with following team into room for interview. Patient reports improvement in mood, sleep, and appetite. Denies any thoughts of self harm. States his mind is clear. States he's looking forward to attending [facility], has been making lists of things to get done upon discharge. Remains compliant with medications, denies any side effects and remains in good behavioral control. Pt was seen and evaluated, chart reviewed. NO events over the weekend however pt put in a 72 hr letter discharge letter on 1/27/19 at 11:00AM. Patient observed ambulating beach, dressed in personal clothes, noted to be well groomed. Cooperative with following team into room for interview. Patient reports improvement in mood, sleep, and appetite. Denies any thoughts of self harm. States his mind is clear. States he's looking forward to attending [facility], has been making lists of things to get done upon discharge. Remains compliant with medications, denies any side effects and remains in good behavioral control.

## 2019-01-29 RX ORDER — CITALOPRAM 10 MG/1
1 TABLET, FILM COATED ORAL
Qty: 7 | Refills: 0
Start: 2019-01-29 | End: 2019-02-04

## 2019-01-29 RX ORDER — METFORMIN HYDROCHLORIDE 850 MG/1
1 TABLET ORAL
Qty: 0 | Refills: 0 | COMMUNITY
Start: 2019-01-29

## 2019-01-29 RX ORDER — ARIPIPRAZOLE 15 MG/1
1 TABLET ORAL
Qty: 7 | Refills: 0
Start: 2019-01-29 | End: 2019-02-04

## 2019-01-29 RX ORDER — ARIPIPRAZOLE 15 MG/1
1 TABLET ORAL
Qty: 0 | Refills: 0 | COMMUNITY

## 2019-01-29 RX ADMIN — METFORMIN HYDROCHLORIDE 500 MILLIGRAM(S): 850 TABLET ORAL at 08:25

## 2019-01-29 RX ADMIN — METFORMIN HYDROCHLORIDE 500 MILLIGRAM(S): 850 TABLET ORAL at 20:06

## 2019-01-29 RX ADMIN — CITALOPRAM 40 MILLIGRAM(S): 10 TABLET, FILM COATED ORAL at 08:25

## 2019-01-29 RX ADMIN — ARIPIPRAZOLE 15 MILLIGRAM(S): 15 TABLET ORAL at 20:06

## 2019-01-29 RX ADMIN — Medication 100 MILLIGRAM(S): at 20:06

## 2019-01-29 NOTE — DISCHARGE NOTE BEHAVIORAL HEALTH - NSBHDCMEDSFT_PSY_A_CORE
Patient was continued on his outpatient medications: Abilify 15mg Po QHS and Celexa. No further medications were ordered as is Abilify was just increased a few days prior to admission by his outpatient provider, and patient had already started reporting improvement in his suicidality after being on the unit for 1 day.

## 2019-01-29 NOTE — PROGRESS NOTE ADULT - SUBJECTIVE AND OBJECTIVE BOX
pt stable alert in NAD  no new complaints    MAJOR DEPRESSIVE DISORDER  ^PSYCH  No pertinent family history in first degree relatives  Handoff  MEWS Score  Suicide attempt  Vertigo  Depression  No pertinent past medical history  Major depressive disorder with current active episode, unspecified depression episode severity, unspecified whether recurrent  Depression  Suicide attempt  Major depressive disorder with current active episode, unspecified depression episode severity, unspecified whether recurrent  Cannabis dependence  Severe episode of recurrent major depressive disorder, without psychotic features  History of inguinal hernia repair  Abdominal hernia  No significant past surgical history  PSYCH  90+    HEALTH ISSUES - PROBLEM Dx:  Depression: Depression  Suicide attempt: Suicide attempt  Major depressive disorder with current active episode, unspecified depression episode severity, unspecified whether recurrent: Major depressive disorder with current active episode, unspecified depression episode severity, unspecified whether recurrent  Cannabis dependence  Severe episode of recurrent major depressive disorder, without psychotic features        PAST MEDICAL & SURGICAL HISTORY:  Suicide attempt  Vertigo  Depression  History of inguinal hernia repair: Left    No Known Drug Allergies  strawberry (Rash)      FAMILY HISTORY:  No pertinent family history in first degree relatives      acetaminophen   Tablet .. 650 milliGRAM(s) Oral every 6 hours PRN  aluminum hydroxide/magnesium hydroxide/simethicone Suspension 30 milliLiter(s) Oral every 4 hours PRN  ARIPiprazole 15 milliGRAM(s) Oral at bedtime  citalopram 40 milliGRAM(s) Oral daily  docusate sodium 100 milliGRAM(s) Oral two times a day  haloperidol     Tablet 5 milliGRAM(s) Oral every 6 hours PRN  hydrOXYzine hydrochloride 50 milliGRAM(s) Oral every 6 hours PRN  metFORMIN 500 milliGRAM(s) Oral two times a day  nicotine  Polacrilex Gum 2 milliGRAM(s) Oral every 3 hours PRN      T(C): 35.8 (01-29-19 @ 06:37), Max: 36.4 (01-28-19 @ 17:37)  HR: 64 (01-29-19 @ 06:37) (64 - 73)  BP: 125/57 (01-29-19 @ 06:37) (104/63 - 125/57)  RR: 18 (01-29-19 @ 06:37) (18 - 18)  SpO2: --    PE;  general:  no acute cahnges in nad    Lungs:    Heart:    EXT:    Neuro:    no deficits ntoed                        CAPILLARY BLOOD GLUCOSE

## 2019-01-29 NOTE — DISCHARGE NOTE BEHAVIORAL HEALTH - MEDICATION SUMMARY - MEDICATIONS TO TAKE
I will START or STAY ON the medications listed below when I get home from the hospital:    citalopram 40 mg oral tablet  -- 1 tab(s) by mouth once a day x 7 days   -- Indication: For Mood    metFORMIN 500 mg oral tablet  -- 1 tab(s) by mouth 2 times a day  -- Indication: For DM    ARIPiprazole 15 mg oral tablet  -- 1 tab(s) by mouth once a day (at bedtime) x 7 days   -- Indication: For Mood I will START or STAY ON the medications listed below when I get home from the hospital:    citalopram 40 mg oral tablet  -- 1 tab(s) by mouth once a day x 7 days   -- Indication: For Mood    metFORMIN 500 mg oral tablet  -- 1 tab(s) by mouth 2 times a day; continue to take until told otherwise by provider  -- Indication: For DM    ARIPiprazole 15 mg oral tablet  -- 1 tab(s) by mouth once a day (at bedtime) x 7 days   -- Indication: For Mood

## 2019-01-29 NOTE — DISCHARGE NOTE BEHAVIORAL HEALTH - NSBHDCSUICPROTECTFT_PSY_A_CORE
Future orientation, lack of current suicidality on discharge, improved support from father, willingness to follow up outpatient

## 2019-01-29 NOTE — PROGRESS NOTE BEHAVIORAL HEALTH - NSBHPTASSESSDT_PSY_A_CORE
25-Jan-2019 09:48
27-Jan-2019 12:53
28-Jan-2019 10:46
29-Jan-2019 09:50
24-Jan-2019 13:10
25-Jan-2019 09:48

## 2019-01-29 NOTE — PROGRESS NOTE BEHAVIORAL HEALTH - NSBHCHARTREVIEWVS_PSY_A_CORE FT
Vital Signs Last 24 Hrs  T(C): 36.5 (24 Jan 2019 09:22), Max: 36.7 (23 Jan 2019 20:00)  T(F): 97.7 (24 Jan 2019 09:22), Max: 98 (23 Jan 2019 20:00)  HR: 71 (24 Jan 2019 09:22) (71 - 80)  BP: 127/71 (24 Jan 2019 09:22) (111/65 - 127/71)  BP(mean): --  RR: 18 (24 Jan 2019 09:22) (16 - 18)  SpO2: 98% (23 Jan 2019 15:53) (98% - 98%)
Vital Signs Last 24 Hrs  T(C): 36.3 (26 Jan 2019 10:00), Max: 36.4 (25 Jan 2019 19:18)  T(F): 97.4 (26 Jan 2019 10:00), Max: 97.5 (25 Jan 2019 19:18)  HR: 76 (26 Jan 2019 10:00) (61 - 96)  BP: 112/63 (26 Jan 2019 10:00) (107/66 - 112/63)  BP(mean): --  RR: 18 (26 Jan 2019 10:00) (16 - 18)  SpO2: --
Vital Signs Last 24 Hrs  T(C): 35.8 (29 Jan 2019 06:37), Max: 36.4 (28 Jan 2019 17:37)  T(F): 96.5 (29 Jan 2019 06:37), Max: 97.5 (28 Jan 2019 17:37)  HR: 64 (29 Jan 2019 06:37) (64 - 68)  BP: 125/57 (29 Jan 2019 06:37) (104/63 - 125/57)  BP(mean): --  RR: 18 (29 Jan 2019 06:37) (18 - 18)  SpO2: --
Vital Signs Last 24 Hrs  T(C): 36 (25 Jan 2019 06:05), Max: 36 (24 Jan 2019 18:24)  T(F): 96.8 (25 Jan 2019 06:05), Max: 96.8 (24 Jan 2019 18:24)  HR: 72 (25 Jan 2019 06:05) (72 - 73)  BP: 131/70 (25 Jan 2019 06:05) (108/72 - 131/70)  BP(mean): --  RR: 17 (25 Jan 2019 06:05) (17 - 18)  SpO2: --
Vital Signs Last 24 Hrs  T(C): 36.2 (28 Jan 2019 09:24), Max: 36.6 (27 Jan 2019 16:11)  T(F): 97.1 (28 Jan 2019 09:24), Max: 97.8 (27 Jan 2019 16:11)  HR: 73 (28 Jan 2019 09:24) (70 - 73)  BP: 120/58 (28 Jan 2019 09:24) (104/56 - 121/51)  BP(mean): --  RR: 18 (28 Jan 2019 09:24) (16 - 20)  SpO2: --

## 2019-01-29 NOTE — DISCHARGE NOTE BEHAVIORAL HEALTH - HPI (INCLUDE ILLNESS QUALITY, SEVERITY, DURATION, TIMING, CONTEXT, MODIFYING FACTORS, ASSOCIATED SIGNS AND SYMPTOMS)
Patient reports feeling low, endorsing poor sleep and appetite, for at least 3 months now with development of passive suicidal ideations for the past 3-4 days. He denies any intent or plan, but states he was having feelings of "I shouldn't be alive". He also reports having flashbacks of him slitting his throat in 2016 in a suicide attempts, but denies having them since his admission. He attributes his low mood to lack of support from his family, which he mentions several times during the interview. He does report some improvement in his mood as he's had improved sleep on the unit, but continues to feel depressed. Denies suicidal ideations on evaluation today. Denies any history of hypomania or angelica, denies symptoms of psychosis. Patient reports feeling low, endorsing poor sleep and appetite, for at least 3 months now with development of passive suicidal ideations for the past 3-4 days. He denies any intent or plan, but states he was having feelings of "I shouldn't be alive". He also reports having flashbacks of him slitting his throat in 2016 in a suicide attempts, but denies having them since his admission. He attributes his low mood to lack of support from his family, which he mentions several times during the interview. He does report some improvement in his mood as he's had improved sleep on the unit, but continues to feel depressed. Denies suicidal ideations on evaluation today. Denies any history of hypomania or angelica, denies symptoms of psychosis.      Hospital course:  Patient's mood, sleep, and appetite improved over the course of hospitalizations, and he had improved insight into maintaining his outpatient appointments, especially with therapist. He also reconciled with father, as a big contributor to patient's mood was the lack of support from his family. He was future oriented, and motivated to comply with treatment plan upon discharge. he denied any passive or active thoughts of self harm. He remained in good behavioral control, attended groups and was compliant with medications throughout his stay.

## 2019-01-29 NOTE — PROGRESS NOTE BEHAVIORAL HEALTH - CASE SUMMARY
Pt was seen and discussed with the resident. Chart reviewed. Agree with assessment and plan above. On evaluation, endorsed an improved mood. Denied suicidal/homicidal ideation, intent or plan. Continue with medications as above.
Pt was seen and discussed with the resident. Chart reviewed. Agree with assessment and plan above. On evaluation, endorsed an improved mood. Denied suicidal/homicidal ideation, intent or plan. Continue with medications as above.
Pt was seen and discussed with the resident. Chart reviewed. Agree with assessment and plan above. On evaluation, pt endorsed being in good spirits and being future-oriented. Looking forward to going back to work and going home. Denied suicidal/homicidal ideation, intent or plan. Continue with medications as above.
Pt was seen and discussed with the resident. Chart reviewed. Agree with assessment and plan above. On evaluation, pt is future-oriented, looking forward to getting his benefits and other errands accomplished once leaving the hospital. States that he spoke with his father and will continue to live with him. Denied suicidal/homicidal ideation, intent or plan. Continue with medications as above.
Pt was seen and evaluated with the resident, chart reviewed. Agree with assessment and plan above. Continue medications as above.

## 2019-01-29 NOTE — DISCHARGE NOTE BEHAVIORAL HEALTH - PAST PSYCHIATRIC HISTORY
4 prior IPP admissions, most recent in May 2018. One prior suicide attempt in 2016 when he slit his throat. Follows with Dr. Akira quinonez

## 2019-01-29 NOTE — PROGRESS NOTE BEHAVIORAL HEALTH - NSBHADMITIPBHPROVFT_PSY_A_CORE
Spoke to Dr. Champion at [facility] [id number]
Spoke to Dr. Champion at [facility] 5340
Spoke to Dr. Champion at [facility] 5374
Spoke to Dr. Champion at [facility] 6459
Spoke to Dr. Champion at [facility] 0369

## 2019-01-29 NOTE — DISCHARGE NOTE BEHAVIORAL HEALTH - NSTOBACCOSMKCESSPRO_GEN_A_NCS
[facility] Smoking Cessation Support Group... [facility] Smoking Cessation Support Group...

## 2019-01-29 NOTE — PROGRESS NOTE BEHAVIORAL HEALTH - NSBHFUPINTERVALHXFT_PSY_A_CORE
Patient evaluated at bedside, laying in bed. States he went to bed early (at 9pm) which is why he woke up at 4am and is not feeling a bit tired. Patient was noted to be in crisis survival skills group yesterday and participated. Maintains good mood and denies any suicidal ideations. Future oriented, looking forward to discharge and attending [facility]. Has been in touch with father. No other acute complaints. No behavioral outbursts, remains compliant with medications. Patient evaluated at bedside, laying in bed. Chart reviewed. No events overnight. States he went to bed early (at 9pm) which is why he woke up at 4am and is not feeling a bit tired. Patient was noted to be in crisis survival skills group yesterday and participated. Maintains good mood and denies any suicidal ideations. Future oriented, looking forward to discharge and attending [facility]. Has been in touch with father. No other acute complaints. No behavioral outbursts, remains compliant with medications.

## 2019-01-29 NOTE — PROGRESS NOTE BEHAVIORAL HEALTH - PRIMARY DX
Severe episode of recurrent major depressive disorder, without psychotic features

## 2019-01-29 NOTE — PROGRESS NOTE BEHAVIORAL HEALTH - RISK ASSESSMENT
Patient is at elevated risk give current mood episode despite medication adherence, ongoing psychosocial stressors at home, history of past suicide attempt mitigated by admission to the IPP unit.

## 2019-01-29 NOTE — PROGRESS NOTE BEHAVIORAL HEALTH - NSBHFUPINTERVALCCFT_PSY_A_CORE
"I'm ready to go home"
"Pretty good"
"Today I woke up okay"
Patient states, "I'm feeling much better now."
" tereza ok "

## 2019-01-29 NOTE — DISCHARGE NOTE BEHAVIORAL HEALTH - NSBHDCSUICFCTRMIT_PSY_A_CORE
psychoeducation, medication and outpatient follow up adherence encouragement, willingness to follow up and motivated to improve mental health

## 2019-01-29 NOTE — DISCHARGE NOTE BEHAVIORAL HEALTH - NSBHDCSWCOMMENTSFT_PSY_A_CORE
Discharge Summary Faxed to [phone] on 01/30/2019 at 1pm. Discharge Summary Faxed to [phone] on 01/30/2019 at 1pm

## 2019-01-29 NOTE — PROGRESS NOTE BEHAVIORAL HEALTH - SUMMARY
39 y/o male, single, domiciled with father, unemployed, with history of MDD, 3 prior IPP admissions, 1 prior suicide attempt, cannabis use DO who presented with low mood and passive suicidal ideations in the context of multiple psychosocial stressors including lack of family support.    Patient on evaluation today continues to endorse low mood, but reports some improvement after sleeping well on the unit. He is denying any suicidal ideations, but still expresses depressed mood. His MSE is significant for constricted and depressed affect as well.     Plan:  1) Continue Celexa 40mg QD and Abilify 15mg QHS  2) Patient likely to be a good candidate for [facility] once stable.   3) Re-attempt to obtain collateral from family, did not answer today.
37 y/o male, single, domiciled with father, unemployed, with history of MDD, 3 prior IPP admissions, 1 prior suicide attempt, cannabis use DO who presented with low mood and passive suicidal ideations in the context of multiple psychosocial stressors including lack of family support.    Patient on evaluation today reports improved sleep, improved mood, and more clarity in his thinking. Denies any thoughts of suicide, is future oriented, and motivated to go to partial program.      Plan:  1) Continue Celexa 40mg QD and Abilify 15mg QHS  2) Discharge to [facility] likely early next week.
37 y/o male, single, domiciled with father, unemployed, with history of MDD, 3 prior IPP admissions, 1 prior suicide attempt, cannabis use DO who presented with low mood and passive suicidal ideations in the context of multiple psychosocial stressors including lack of family support.    Patient on evaluation today reports feeling at baseline, with improved mood, and denies any thoughts of suicide, is future oriented, and motivated to go to partial program. Discharge Wednesday with [facility] intake Thursdays. Of note, patient also submitted 72 hour letter 1/27/19.       Plan:  1) Continue Celexa 40mg QD and Abilify 15mg QHS  2) Discharge on Wednesday, with [facility] on Thursday 1/31/19.
37 y/o male, single, domiciled with father, unemployed, with history of MDD, 3 prior IPP admissions, 1 prior suicide attempt, cannabis use DO who presented with low mood and passive suicidal ideations in the context of multiple psychosocial stressors including lack of family support.    Patient remains stable in mood and denies any thoughts of suicide. He states he's ready for discharge, is future oriented, and motivated to go to partial program. Discharge tomorrow with [facility] intake Thursday. Of note, patient also submitted 72 hour letter 1/27/19.       Plan:  1) Continue Celexa 40mg QD and Abilify 15mg QHS  2) Discharge anticipated tomorrow, with [facility] on Thursday 1/31/19.
39 y/o male, single, domiciled with father, unemployed, with history of MDD, 3 prior IPP admissions, 1 prior suicide attempt, cannabis use DO who presented with low mood and passive suicidal ideations in the context of multiple psychosocial stressors including lack of family support.    Patient on evaluation today reports improved sleep, improved mood, and more clarity in his thinking. Denies any thoughts of suicide, is future oriented, and motivated to go to partial program.      Plan:  1) Continue Celexa 40mg QD and Abilify 15mg QHS  2) Discharge to [facility] likely early next week.

## 2019-01-29 NOTE — PROGRESS NOTE BEHAVIORAL HEALTH - NSBHATTESTSEENBY_PSY_A_CORE
Attending Psychiatrist supervising NP/Trainee, meeting pt...
attending Psychiatrist without NP/Trainee
Attending Psychiatrist supervising NP/Trainee, meeting pt...

## 2019-01-30 VITALS
HEART RATE: 82 BPM | SYSTOLIC BLOOD PRESSURE: 97 MMHG | TEMPERATURE: 98 F | RESPIRATION RATE: 20 BRPM | DIASTOLIC BLOOD PRESSURE: 62 MMHG

## 2019-01-30 PROBLEM — T14.91XA SUICIDE ATTEMPT, INITIAL ENCOUNTER: Chronic | Status: ACTIVE | Noted: 2019-01-23

## 2019-01-30 LAB
CHOLEST SERPL-MCNC: 162 MG/DL — SIGNIFICANT CHANGE UP (ref 100–200)
ESTIMATED AVERAGE GLUCOSE: 177 MG/DL — HIGH (ref 68–114)
HBA1C BLD-MCNC: 7.8 % — HIGH (ref 4–5.6)
HDLC SERPL-MCNC: 48 MG/DL — SIGNIFICANT CHANGE UP
LIPID PNL WITH DIRECT LDL SERPL: 104 MG/DL — SIGNIFICANT CHANGE UP (ref 4–129)
TOTAL CHOLESTEROL/HDL RATIO MEASUREMENT: 3.4 RATIO — LOW (ref 4–5.5)
TRIGL SERPL-MCNC: 71 MG/DL — SIGNIFICANT CHANGE UP (ref 10–149)
TSH SERPL-MCNC: 0.56 UIU/ML — SIGNIFICANT CHANGE UP (ref 0.27–4.2)

## 2019-01-30 RX ADMIN — METFORMIN HYDROCHLORIDE 500 MILLIGRAM(S): 850 TABLET ORAL at 08:42

## 2019-01-30 RX ADMIN — CITALOPRAM 40 MILLIGRAM(S): 10 TABLET, FILM COATED ORAL at 08:42

## 2019-01-30 NOTE — CHART NOTE - NSCHARTNOTEFT_GEN_A_CORE
Social Work Discharge Note:    Patient is for discharge today.   He is alert and oriented x3.  Mood is improved.  Anxiety has decreased.  Insight and judgment have improved.  Suicidal / homicidal ideation denied.      Patient will be discharged to his home in [place].  Plan is for referral to [facility].  He is aware and agreeable to same.      Patient is aware and agreeable to discharge today.

## 2019-02-01 DIAGNOSIS — R45.851 SUICIDAL IDEATIONS: ICD-10-CM

## 2019-02-01 DIAGNOSIS — Z91.5 PERSONAL HISTORY OF SELF-HARM: ICD-10-CM

## 2019-02-01 DIAGNOSIS — Z72.0 TOBACCO USE: ICD-10-CM

## 2019-02-01 DIAGNOSIS — Z91.19 PATIENT'S NONCOMPLIANCE WITH OTHER MEDICAL TREATMENT AND REGIMEN: ICD-10-CM

## 2019-02-01 DIAGNOSIS — F32.9 MAJOR DEPRESSIVE DISORDER, SINGLE EPISODE, UNSPECIFIED: ICD-10-CM

## 2019-02-01 DIAGNOSIS — F12.20 CANNABIS DEPENDENCE, UNCOMPLICATED: ICD-10-CM

## 2019-02-01 DIAGNOSIS — F33.2 MAJOR DEPRESSIVE DISORDER, RECURRENT SEVERE WITHOUT PSYCHOTIC FEATURES: ICD-10-CM

## 2019-02-01 LAB
CARBOXYTHC UR CFM-MCNC: 266 NG/ML — SIGNIFICANT CHANGE UP
CARBOXYTHC UR QL SCN: 391 — SIGNIFICANT CHANGE UP
CARBOXYTHC UR QL SCN: 68.1 MG/DL — SIGNIFICANT CHANGE UP
THC CREATININE URINE: 68.1 MG/DL — SIGNIFICANT CHANGE UP
THC METABOLITE/CREAT URINE: 391 — SIGNIFICANT CHANGE UP

## 2019-03-05 ENCOUNTER — OUTPATIENT (OUTPATIENT)
Dept: OUTPATIENT SERVICES | Facility: HOSPITAL | Age: 39
LOS: 1 days | Discharge: HOME | End: 2019-03-05

## 2019-03-05 DIAGNOSIS — F12.10 CANNABIS ABUSE, UNCOMPLICATED: ICD-10-CM

## 2019-03-05 DIAGNOSIS — Z98.890 OTHER SPECIFIED POSTPROCEDURAL STATES: Chronic | ICD-10-CM

## 2019-03-05 DIAGNOSIS — F33.1 MAJOR DEPRESSIVE DISORDER, RECURRENT, MODERATE: ICD-10-CM

## 2019-03-06 ENCOUNTER — OUTPATIENT (OUTPATIENT)
Dept: OUTPATIENT SERVICES | Facility: HOSPITAL | Age: 39
LOS: 1 days | Discharge: HOME | End: 2019-03-06

## 2019-03-06 DIAGNOSIS — Z98.890 OTHER SPECIFIED POSTPROCEDURAL STATES: Chronic | ICD-10-CM

## 2019-03-06 DIAGNOSIS — F33.2 MAJOR DEPRESSIVE DISORDER, RECURRENT SEVERE WITHOUT PSYCHOTIC FEATURES: ICD-10-CM

## 2019-03-08 ENCOUNTER — OUTPATIENT (OUTPATIENT)
Dept: OUTPATIENT SERVICES | Facility: HOSPITAL | Age: 39
LOS: 1 days | Discharge: HOME | End: 2019-03-08

## 2019-03-08 DIAGNOSIS — F33.2 MAJOR DEPRESSIVE DISORDER, RECURRENT SEVERE WITHOUT PSYCHOTIC FEATURES: ICD-10-CM

## 2019-03-08 DIAGNOSIS — Z98.890 OTHER SPECIFIED POSTPROCEDURAL STATES: Chronic | ICD-10-CM

## 2019-03-15 DIAGNOSIS — F33.3 MAJOR DEPRESSIVE DISORDER, RECURRENT, SEVERE WITH PSYCHOTIC SYMPTOMS: ICD-10-CM

## 2019-03-20 ENCOUNTER — OUTPATIENT (OUTPATIENT)
Dept: OUTPATIENT SERVICES | Facility: HOSPITAL | Age: 39
LOS: 1 days | Discharge: HOME | End: 2019-03-20

## 2019-03-20 DIAGNOSIS — F33.3 MAJOR DEPRESSIVE DISORDER, RECURRENT, SEVERE WITH PSYCHOTIC SYMPTOMS: ICD-10-CM

## 2019-03-20 DIAGNOSIS — Z98.890 OTHER SPECIFIED POSTPROCEDURAL STATES: Chronic | ICD-10-CM

## 2019-04-17 ENCOUNTER — OUTPATIENT (OUTPATIENT)
Dept: OUTPATIENT SERVICES | Facility: HOSPITAL | Age: 39
LOS: 1 days | Discharge: HOME | End: 2019-04-17

## 2019-04-17 DIAGNOSIS — F33.3 MAJOR DEPRESSIVE DISORDER, RECURRENT, SEVERE WITH PSYCHOTIC SYMPTOMS: ICD-10-CM

## 2019-04-17 DIAGNOSIS — Z98.890 OTHER SPECIFIED POSTPROCEDURAL STATES: Chronic | ICD-10-CM

## 2019-05-01 ENCOUNTER — OUTPATIENT (OUTPATIENT)
Dept: OUTPATIENT SERVICES | Facility: HOSPITAL | Age: 39
LOS: 1 days | Discharge: HOME | End: 2019-05-01

## 2019-05-01 DIAGNOSIS — F33.3 MAJOR DEPRESSIVE DISORDER, RECURRENT, SEVERE WITH PSYCHOTIC SYMPTOMS: ICD-10-CM

## 2019-05-01 DIAGNOSIS — Z98.890 OTHER SPECIFIED POSTPROCEDURAL STATES: Chronic | ICD-10-CM

## 2019-06-26 NOTE — ED PROVIDER NOTE - AGGRAVATING FACTORS
----- Message from Lian Willson MD sent at 6/26/2019  7:45 AM CDT -----  RHIG 28-32 weeks, rx UTI with Augmentin 875/125 BID x 7 days    
Pt updated on her results and treatment.  She states that she will not need the Rhogam injection as her  is RH Negative.  Pharmacy:  [facility] in [place].  Pt verbalizes understanding.  
none

## 2019-07-05 ENCOUNTER — EMERGENCY (EMERGENCY)
Facility: HOSPITAL | Age: 39
LOS: 1 days | Discharge: HOME | End: 2019-07-05

## 2019-07-05 ENCOUNTER — EMERGENCY (EMERGENCY)
Facility: HOSPITAL | Age: 39
LOS: 1 days | Discharge: HOME | End: 2019-07-05
Admitting: INTERNAL MEDICINE
Payer: MEDICAID

## 2019-07-05 VITALS
RESPIRATION RATE: 18 BRPM | HEART RATE: 95 BPM | TEMPERATURE: 97 F | OXYGEN SATURATION: 99 % | SYSTOLIC BLOOD PRESSURE: 127 MMHG | DIASTOLIC BLOOD PRESSURE: 58 MMHG

## 2019-07-05 VITALS — WEIGHT: 141.1 LBS

## 2019-07-05 VITALS
TEMPERATURE: 98 F | SYSTOLIC BLOOD PRESSURE: 112 MMHG | DIASTOLIC BLOOD PRESSURE: 64 MMHG | RESPIRATION RATE: 19 BRPM | OXYGEN SATURATION: 97 % | HEART RATE: 79 BPM

## 2019-07-05 DIAGNOSIS — Z98.890 OTHER SPECIFIED POSTPROCEDURAL STATES: Chronic | ICD-10-CM

## 2019-07-05 PROCEDURE — 99282 EMERGENCY DEPT VISIT SF MDM: CPT

## 2019-07-05 NOTE — ED PROVIDER NOTE - OBJECTIVE STATEMENT
37 yo M with pain to tooth #12 and #13 over the last few days. 39 yo M with pain to tooth #12 and #13 and felt them become loose over the last few days. Pain is throbbing, non-radiating and associated with facial swelling over the last 2 days. Symptoms are moderate. No fever, chills, difficulty breathing, foul odor, trismus, discharge, warmth, decreased PO fluids, or malaise.

## 2019-07-05 NOTE — ED PROVIDER NOTE - NS ED ROS FT
Review of Systems:  	•	CONSTITUTIONAL - no fever, no diaphoresis, no chills  	•	SKIN - no rash  	•	HEMATOLOGIC - no bleeding, no bruising  	•	EYES - no eye pain, no blurry vision  	•	ENT - +dental pain, +facial swelling, no congestion  	•	RESPIRATORY - no shortness of breath, no cough  	•	GI - no nausea, no vomiting  	•	NEUROLOGIC - no weakness, no headache, no paresthesias, no LOC  	All other ROS are negative except as documented in HPI.

## 2019-07-05 NOTE — PROGRESS NOTE ADULT - SUBJECTIVE AND OBJECTIVE BOX
Patient is a 38y old  Male who presents with a chief complaint of Upper right dental pain and swelling    HPI: Teeth (#12 and #13) came loose last week and swelling began right after. Patient went to the ED this afternoon.       PAST MEDICAL & SURGICAL HISTORY:  Suicide attempt  Vertigo  Depression  History of inguinal hernia repair: Left    ( -  ) heart valve replacement  ( -  ) joint replacement  ( -  ) pregnancy    MEDICATIONS  (STANDING): Celexa, Abilify, Metformin  MEDICATIONS  (PRN): none reported      Allergies : No Known Drug Allergies  strawberry (Rash)    FAMILY HISTORY:  No pertinent family history in first degree relatives      *SOCIAL HISTORY: ( -  ) Tobacco; ( -  ) ETOH    *Last Dental Visit:    Vital Signs Last 24 Hrs  T(C): 36.7 (05 Jul 2019 12:52), Max: 36.7 (05 Jul 2019 12:52)  T(F): 98.1 (05 Jul 2019 12:52), Max: 98.1 (05 Jul 2019 12:52)  HR: 79 (05 Jul 2019 12:52) (79 - 95)  BP: 112/64 (05 Jul 2019 12:52) (112/64 - 127/58)  BP(mean): --  RR: 19 (05 Jul 2019 12:52) (18 - 19)  SpO2: 97% (05 Jul 2019 12:52) (97% - 99%)      EOE:  TMJ (  -) clicks                     ( -  ) pops                     ( -  ) crepitus             Mandible <<FROM>>             Facial bones and MOM <<grossly intact>>             ( -  ) trismus             ( -  ) lymphadenopathy             ( +  ) swelling - upper right; buccal swelling             ( +  ) asymmetry             ( +  ) palpation             (  - ) dyspnea             (  - ) dysphagia             ( -  ) loss of consciousness    IOE:  <<permanent>> <<multiple carious teeth>> AND <<multiple missing teeth>>           hard/soft palate:  ( - ) palatal torus, <<No pathology noted>>           tongue/FOM <<No pathology noted>>         buccal mucosa: (apical to #12 and #13)           ( + ) percussion           (  + ) palpation           ( +  ) swelling            ( +  ) abscess           ( -  ) sinus tract    Dentition present: <<partially edentulous   >>  Mobility: <<#12, 13  >>  Caries: << gross caries across dentition  >>         *DENTAL RADIOGRAPHS: x1 periapical

## 2019-07-05 NOTE — PROGRESS NOTE ADULT - ASSESSMENT
Assessment: Extraction was recommended for numbers 12 and 13. Patient was also informed of need to extract numbers 14 and 15, which are grossly decayed root tips    PROCEDURE:   1. Medical History Reviewed  2. No premedication  3. Treatment:  Verbal and written consent given. Risks were explained as per OS sheet dated 2/13/2000. Site-site completed.   Anesthesia: <<2 carpules 4% Septocaine with 1:100,000 epinephrine via buccal and lingual infiltration   >>   Treatment: << Simple Extraction #12 and #13   >>   - Rinsed extraction sites with Saline   - Hemostasis obtained  4. No complications  5. Next visit: follow-up with dentist for comprehensive care        RECOMMENDATIONS:  1) <<Follow Post Op Instruction sheet  2) Dental F/U with outpatient dentist for comprehensive dental care.   3) If any difficulty swallowing/breathing, fever occur, return to ER.     Rufino Rob

## 2019-07-05 NOTE — ED PROVIDER NOTE - CLINICAL SUMMARY MEDICAL DECISION MAKING FREE TEXT BOX
Dental abscess. Will refer to dental clinic. I have discussed the discharge plan with the patient. The patient agrees with the plan, as discussed.  The patient understands Emergency Department diagnosis is a preliminary diagnosis often based on limited information and that the patient must adhere to the follow-up plan as discussed.  The patient understands that if the symptoms worsen or if prescribed medications do not have the desired/planned effect that the patient may return to the Emergency Department at any time for further evaluation and treatment.

## 2019-07-05 NOTE — ED PROVIDER NOTE - PHYSICAL EXAMINATION
VITAL SIGNS: I have reviewed nursing notes and confirm.  CONSTITUTIONAL: Well-developed; well-nourished; in no acute distress.  SKIN: Skin exam is warm and dry, no acute rash.  HEAD: Normocephalic; atraumatic.  EYES: PERRL, EOM intact; conjunctiva and sclera clear.  ENT: No nasal discharge; airway clear. +Left upper buccal swelling +abscess. +Tenderness to percussion to tooth #12 and #13.   NECK: Supple; non tender.  CARD: S1, S2 normal; no murmurs, gallops, or rubs. Regular rate and rhythm.  RESP: Clear to auscultation bilaterally. No wheezes, rales or rhonchi.  ABD: Normal bowel sounds; soft; non-distended; non-tender.   EXT: Normal ROM. No edema.  LYMPH: No acute cervical adenopathy.  NEURO: Alert, oriented. Grossly unremarkable. No focal deficits.  PSYCH: Cooperative, appropriate. VITAL SIGNS: I have reviewed nursing notes and confirm.  CONSTITUTIONAL: Well-developed; well-nourished; in no acute distress.  SKIN: Skin exam is warm and dry, no acute rash.  HEAD: Normocephalic; atraumatic.  EYES: PERRL, EOM intact; conjunctiva and sclera clear.  ENT: No nasal discharge; airway clear. +Left upper buccal swelling +abscess. +Tenderness to percussion to tooth #12 and #13.   NECK: Supple; non tender.  LYMPH: No acute cervical adenopathy.  NEURO: Alert, oriented. Grossly unremarkable. No focal deficits.  PSYCH: Cooperative, appropriate.

## 2019-07-10 DIAGNOSIS — K08.89 OTHER SPECIFIED DISORDERS OF TEETH AND SUPPORTING STRUCTURES: ICD-10-CM

## 2019-07-10 DIAGNOSIS — Z91.018 ALLERGY TO OTHER FOODS: ICD-10-CM

## 2019-07-10 DIAGNOSIS — K04.7 PERIAPICAL ABSCESS WITHOUT SINUS: ICD-10-CM

## 2019-07-10 DIAGNOSIS — R22.0 LOCALIZED SWELLING, MASS AND LUMP, HEAD: ICD-10-CM

## 2019-07-10 DIAGNOSIS — Z79.899 OTHER LONG TERM (CURRENT) DRUG THERAPY: ICD-10-CM

## 2019-07-10 DIAGNOSIS — Z79.84 LONG TERM (CURRENT) USE OF ORAL HYPOGLYCEMIC DRUGS: ICD-10-CM

## 2019-09-17 ENCOUNTER — OUTPATIENT (OUTPATIENT)
Dept: OUTPATIENT SERVICES | Facility: HOSPITAL | Age: 39
LOS: 1 days | Discharge: HOME | End: 2019-09-17
Payer: MEDICAID

## 2019-09-17 DIAGNOSIS — F33.2 MAJOR DEPRESSIVE DISORDER, RECURRENT SEVERE WITHOUT PSYCHOTIC FEATURES: ICD-10-CM

## 2019-09-17 DIAGNOSIS — F33.1 MAJOR DEPRESSIVE DISORDER, RECURRENT, MODERATE: ICD-10-CM

## 2019-09-17 DIAGNOSIS — Z98.890 OTHER SPECIFIED POSTPROCEDURAL STATES: Chronic | ICD-10-CM

## 2019-09-17 PROCEDURE — 99214 OFFICE O/P EST MOD 30 MIN: CPT | Mod: GC

## 2019-10-08 ENCOUNTER — OUTPATIENT (OUTPATIENT)
Dept: OUTPATIENT SERVICES | Facility: HOSPITAL | Age: 39
LOS: 1 days | Discharge: HOME | End: 2019-10-08
Payer: MEDICAID

## 2019-10-08 DIAGNOSIS — Z98.890 OTHER SPECIFIED POSTPROCEDURAL STATES: Chronic | ICD-10-CM

## 2019-10-08 DIAGNOSIS — F33.9 MAJOR DEPRESSIVE DISORDER, RECURRENT, UNSPECIFIED: ICD-10-CM

## 2019-10-08 PROCEDURE — 99213 OFFICE O/P EST LOW 20 MIN: CPT | Mod: GC

## 2019-11-11 ENCOUNTER — OUTPATIENT (OUTPATIENT)
Dept: OUTPATIENT SERVICES | Facility: HOSPITAL | Age: 39
LOS: 1 days | Discharge: HOME | End: 2019-11-11
Payer: MEDICAID

## 2019-11-11 DIAGNOSIS — Z98.890 OTHER SPECIFIED POSTPROCEDURAL STATES: Chronic | ICD-10-CM

## 2019-11-11 DIAGNOSIS — F33.1 MAJOR DEPRESSIVE DISORDER, RECURRENT, MODERATE: ICD-10-CM

## 2019-11-11 PROCEDURE — 99214 OFFICE O/P EST MOD 30 MIN: CPT | Mod: GC

## 2019-12-27 ENCOUNTER — OUTPATIENT (OUTPATIENT)
Dept: OUTPATIENT SERVICES | Facility: HOSPITAL | Age: 39
LOS: 1 days | Discharge: HOME | End: 2019-12-27
Payer: MEDICAID

## 2019-12-27 DIAGNOSIS — F33.1 MAJOR DEPRESSIVE DISORDER, RECURRENT, MODERATE: ICD-10-CM

## 2019-12-27 DIAGNOSIS — Z98.890 OTHER SPECIFIED POSTPROCEDURAL STATES: Chronic | ICD-10-CM

## 2019-12-27 PROCEDURE — 99214 OFFICE O/P EST MOD 30 MIN: CPT | Mod: GC

## 2020-01-27 ENCOUNTER — OUTPATIENT (OUTPATIENT)
Dept: OUTPATIENT SERVICES | Facility: HOSPITAL | Age: 40
LOS: 1 days | Discharge: HOME | End: 2020-01-27
Payer: MEDICAID

## 2020-01-27 DIAGNOSIS — Z98.890 OTHER SPECIFIED POSTPROCEDURAL STATES: Chronic | ICD-10-CM

## 2020-01-27 DIAGNOSIS — F33.1 MAJOR DEPRESSIVE DISORDER, RECURRENT, MODERATE: ICD-10-CM

## 2020-01-27 PROCEDURE — 99214 OFFICE O/P EST MOD 30 MIN: CPT | Mod: GC

## 2020-02-04 NOTE — PROGRESS NOTE BEHAVIORAL HEALTH - NSBHCHARTREVIEWVS_PSY_A_CORE FT
58.9
ICU Vital Signs Last 24 Hrs  T(C): 36.3 (25 May 2018 10:04), Max: 36.3 (24 May 2018 17:39)  T(F): 97.3 (25 May 2018 10:04), Max: 97.4 (24 May 2018 17:39)  HR: 80 (25 May 2018 10:04) (69 - 80)  BP: 102/68 (25 May 2018 10:04) (102/68 - 115/59)  BP(mean): --  ABP: --  ABP(mean): --  RR: 18 (25 May 2018 10:04) (16 - 18)  SpO2: --
Vital Signs Last 24 Hrs  T(C): 36.2 (26 May 2018 17:25), Max: 36.8 (26 May 2018 09:16)  T(F): 97.2 (26 May 2018 17:25), Max: 98.2 (26 May 2018 09:16)  HR: 67 (26 May 2018 17:25) (58 - 93)  BP: 111/57 (26 May 2018 17:25) (96/56 - 126/56)  BP(mean): --  RR: 17 (26 May 2018 17:25) (17 - 18)  SpO2: --
ICU Vital Signs Last 24 Hrs  T(C): 35.9 (24 May 2018 10:39), Max: 36.5 (23 May 2018 18:45)  T(F): 96.7 (24 May 2018 10:39), Max: 97.7 (23 May 2018 18:45)  HR: 86 (24 May 2018 10:39) (68 - 86)  BP: 108/55 (24 May 2018 10:39) (107/57 - 117/62)  BP(mean): --  ABP: --  ABP(mean): --  RR: 18 (24 May 2018 10:39) (16 - 18)  SpO2: --
ICU Vital Signs Last 24 Hrs  T(C): 35.9 (22 May 2018 10:10), Max: 36.1 (21 May 2018 18:35)  T(F): 96.7 (22 May 2018 10:10), Max: 96.9 (21 May 2018 18:35)  HR: 68 (22 May 2018 10:10) (56 - 71)  BP: 124/60 (22 May 2018 10:10) (110/58 - 124/60)  BP(mean): --  ABP: --  ABP(mean): --  RR: 18 (22 May 2018 10:10) (16 - 19)  SpO2: --
ICU Vital Signs Last 24 Hrs  T(C): 36.2 (23 May 2018 10:56), Max: 36.8 (23 May 2018 06:00)  T(F): 97.2 (23 May 2018 10:56), Max: 98.3 (23 May 2018 06:00)  HR: 72 (23 May 2018 10:56) (72 - 87)  BP: 130/64 (23 May 2018 10:56) (99/53 - 130/64)  BP(mean): --  ABP: --  ABP(mean): --  RR: 18 (23 May 2018 10:56) (16 - 18)  SpO2: --

## 2020-03-09 ENCOUNTER — OUTPATIENT (OUTPATIENT)
Dept: OUTPATIENT SERVICES | Facility: HOSPITAL | Age: 40
LOS: 1 days | Discharge: HOME | End: 2020-03-09

## 2020-03-09 DIAGNOSIS — Z98.890 OTHER SPECIFIED POSTPROCEDURAL STATES: Chronic | ICD-10-CM

## 2020-03-09 DIAGNOSIS — F33.1 MAJOR DEPRESSIVE DISORDER, RECURRENT, MODERATE: ICD-10-CM

## 2020-04-06 ENCOUNTER — OUTPATIENT (OUTPATIENT)
Dept: OUTPATIENT SERVICES | Facility: HOSPITAL | Age: 40
LOS: 1 days | Discharge: HOME | End: 2020-04-06
Payer: MEDICAID

## 2020-04-06 DIAGNOSIS — F33.1 MAJOR DEPRESSIVE DISORDER, RECURRENT, MODERATE: ICD-10-CM

## 2020-04-06 DIAGNOSIS — Z98.890 OTHER SPECIFIED POSTPROCEDURAL STATES: Chronic | ICD-10-CM

## 2020-04-06 PROCEDURE — 99442: CPT | Mod: GC

## 2020-05-05 ENCOUNTER — OUTPATIENT (OUTPATIENT)
Dept: OUTPATIENT SERVICES | Facility: HOSPITAL | Age: 40
LOS: 1 days | Discharge: HOME | End: 2020-05-05
Payer: MEDICAID

## 2020-05-05 DIAGNOSIS — Z98.890 OTHER SPECIFIED POSTPROCEDURAL STATES: Chronic | ICD-10-CM

## 2020-05-05 DIAGNOSIS — F33.1 MAJOR DEPRESSIVE DISORDER, RECURRENT, MODERATE: ICD-10-CM

## 2020-05-05 PROCEDURE — 99442: CPT | Mod: GC

## 2020-06-01 ENCOUNTER — OUTPATIENT (OUTPATIENT)
Dept: OUTPATIENT SERVICES | Facility: HOSPITAL | Age: 40
LOS: 1 days | End: 2020-06-01
Payer: MEDICAID

## 2020-06-01 DIAGNOSIS — Z98.890 OTHER SPECIFIED POSTPROCEDURAL STATES: Chronic | ICD-10-CM

## 2020-06-09 DIAGNOSIS — Z71.89 OTHER SPECIFIED COUNSELING: ICD-10-CM

## 2020-06-15 PROCEDURE — G9001: CPT

## 2020-06-16 ENCOUNTER — OUTPATIENT (OUTPATIENT)
Dept: OUTPATIENT SERVICES | Facility: HOSPITAL | Age: 40
LOS: 1 days | Discharge: HOME | End: 2020-06-16
Payer: MEDICAID

## 2020-06-16 DIAGNOSIS — F33.1 MAJOR DEPRESSIVE DISORDER, RECURRENT, MODERATE: ICD-10-CM

## 2020-06-16 DIAGNOSIS — Z98.890 OTHER SPECIFIED POSTPROCEDURAL STATES: Chronic | ICD-10-CM

## 2020-06-16 PROCEDURE — ZZZZZ: CPT

## 2020-06-16 PROCEDURE — 99213 OFFICE O/P EST LOW 20 MIN: CPT

## 2020-07-27 ENCOUNTER — OUTPATIENT (OUTPATIENT)
Dept: OUTPATIENT SERVICES | Facility: HOSPITAL | Age: 40
LOS: 1 days | Discharge: HOME | End: 2020-07-27

## 2020-07-27 DIAGNOSIS — F33.1 MAJOR DEPRESSIVE DISORDER, RECURRENT, MODERATE: ICD-10-CM

## 2020-07-27 DIAGNOSIS — Z98.890 OTHER SPECIFIED POSTPROCEDURAL STATES: Chronic | ICD-10-CM

## 2020-08-19 ENCOUNTER — EMERGENCY (EMERGENCY)
Facility: HOSPITAL | Age: 40
LOS: 0 days | Discharge: HOME | End: 2020-08-19
Attending: EMERGENCY MEDICINE | Admitting: EMERGENCY MEDICINE
Payer: MEDICAID

## 2020-08-19 VITALS
RESPIRATION RATE: 20 BRPM | OXYGEN SATURATION: 99 % | HEART RATE: 71 BPM | DIASTOLIC BLOOD PRESSURE: 66 MMHG | SYSTOLIC BLOOD PRESSURE: 112 MMHG | TEMPERATURE: 98 F

## 2020-08-19 DIAGNOSIS — K08.9 DISORDER OF TEETH AND SUPPORTING STRUCTURES, UNSPECIFIED: ICD-10-CM

## 2020-08-19 DIAGNOSIS — Z91.018 ALLERGY TO OTHER FOODS: ICD-10-CM

## 2020-08-19 DIAGNOSIS — Z86.59 PERSONAL HISTORY OF OTHER MENTAL AND BEHAVIORAL DISORDERS: ICD-10-CM

## 2020-08-19 DIAGNOSIS — F17.200 NICOTINE DEPENDENCE, UNSPECIFIED, UNCOMPLICATED: ICD-10-CM

## 2020-08-19 DIAGNOSIS — Z98.890 OTHER SPECIFIED POSTPROCEDURAL STATES: Chronic | ICD-10-CM

## 2020-08-19 PROCEDURE — 99283 EMERGENCY DEPT VISIT LOW MDM: CPT

## 2020-08-19 RX ORDER — IBUPROFEN 200 MG
600 TABLET ORAL ONCE
Refills: 0 | Status: COMPLETED | OUTPATIENT
Start: 2020-08-19 | End: 2020-08-19

## 2020-08-19 RX ADMIN — Medication 600 MILLIGRAM(S): at 13:18

## 2020-08-19 NOTE — ED PROVIDER NOTE - OBJECTIVE STATEMENT
40 y/o male with hx Depression, smoker presents to the ED c/o "I have bilateral upper dental pain since last night. The left upper tooth is loose and the right side cracked." no fever/ chills/ facial swelling

## 2020-08-19 NOTE — CONSULT NOTE ADULT - SUBJECTIVE AND OBJECTIVE BOX
Patient is a 39y old  Male who presents with a chief complaint of dental pain in upper right and upper left.    HPI: Patient has pain from very mobile tooth #1 and cracked tooth #14. Last seen by dentist 1.5 years ago at [facility] dental clinic for emergency walk-in. Tried to see 5 different dentist, but none accepted insurance.      PAST MEDICAL & SURGICAL HISTORY:  Suicide attempt  Vertigo  Depression  History of inguinal hernia repair: Left    ( - ) heart valve replacement  ( - ) joint replacement    MEDICATIONS:  Celexa 40mg daily  Abilify 7mg daily      Allergies    No Known Drug Allergies  strawberry (Rash)    Intolerances        FAMILY HISTORY:  No pertinent family history in first degree relatives      *SOCIAL HISTORY: ( + ) Tobacco; ( - ) ETOH    *Last Dental Visit: about 1.5 years ago at [facility] dental clinic for emergency walk-in    Vital Signs Last 24 Hrs  T(C): 36.7 (19 Aug 2020 12:34), Max: 36.7 (19 Aug 2020 12:34)  T(F): 98 (19 Aug 2020 12:34), Max: 98 (19 Aug 2020 12:34)  HR: 71 (19 Aug 2020 12:34) (71 - 71)  BP: 112/66 (19 Aug 2020 12:34) (112/66 - 112/66)  BP(mean): --  RR: 20 (19 Aug 2020 12:34) (20 - 20)  SpO2: 99% (19 Aug 2020 12:34) (99% - 99%)    LABS:                  EOE:  TMJ ( - ) clicks                     ( - ) pops                     ( - ) crepitus             Mandible FROM             Facial bones and MOM grossly intact             ( - ) trismus             ( - ) lymphadenopathy             ( - ) swelling             ( - ) asymmetry             ( - ) palpation             ( - ) dyspnea             ( - ) dysphagia             ( - ) loss of consciousness    IOE:   permanent/primary/mixed dentition: multiple carious and periodontally involvedteeth           hard/soft palate:  No pathology noted           tongue/FOM No pathology noted           labial/buccal mucosa No pathology noted           ( + ) percussion: teeth #1 and #14 positive to percussion           ( + ) palpation: tooth #14 positive to palpation           ( + ) swelling: slight swelling around #14 root tips           ( - ) abscess           ( - ) sinus tract    Mobility: Grade 3 mobility #1  Caries: yes, generalized        *DENTAL RADIOGRAPHS: 3 PAs taken, 2 post ext PAs taken    *ASSESSMENT: Multiple retained root tips and periodontally involved teeth noted: #14 root tips, #15 root tips, #16 root tips, #1 periodontally involved, #2 retained root tip, severely periodontally involved: #3, #4, and #5. Patient made aware of need for comprehensive dental exam and that multiple teeth have hopeless prognoses.       *PLAN: Extraction of teeth #1 and #14 (causing pain)    PROCEDURE:   Temp: 98.1 F  BP: 120/70 P: 72  K05.32  Risks and benefits discussed with patient as per OS sheet dated 07/13/00. Consent obtained. Side site marked.   Anesthesia: topical benzocaine, local infiltration: 2 carpules of 4% Septocaine (1:100,000 epinephrine)  Treatment: Tooth #1 and root tips #14 extracted nonsurgically, sockets curetted, hemostasis achieved. Gauze placed. Extra sterile gauze and post op instructions given to patient written and verbal: no hot or spicy foods/beverages, no smoking, no spitting, no straw use, soft diet: macaroni/mashed potatoes. OTC pain meds for pain. No complications. Patient tolerated treatment well and is satisfied.    RECOMMENDATIONS:  1) Follow post op instructions as were given: no hot or spicy foods/beverages, no smoking, no spitting, no straw use, soft diet: macaroni/mashed potatoes. OTC pain meds for pain  2) Dental F/U with outpatient dentist for comprehensive dental care.   3) If any difficulty swallowing/breathing, fever occur, return to ER.     Bell Issa DDS, Pager #[phone]

## 2020-08-19 NOTE — ED PROVIDER NOTE - ATTENDING CONTRIBUTION TO CARE
40 y/o male smoker with right upper and left upper dental pain x 2 days.  No fever, chills or neck pain. PE:  agree with above.  A/P:  Dental Pain, caries and fracture.  Ibuprofen, and txfr to Dental.

## 2020-08-19 NOTE — ED ADULT NURSE NOTE - CHPI ED NUR SYMPTOMS NEG
no chills/no weakness/no decreased eating/drinking/no dizziness/no vomiting/no pain/no nausea/no tingling/no fever

## 2020-08-24 ENCOUNTER — OUTPATIENT (OUTPATIENT)
Dept: OUTPATIENT SERVICES | Facility: HOSPITAL | Age: 40
LOS: 1 days | Discharge: HOME | End: 2020-08-24
Payer: MEDICAID

## 2020-08-24 DIAGNOSIS — Z98.890 OTHER SPECIFIED POSTPROCEDURAL STATES: Chronic | ICD-10-CM

## 2020-08-24 DIAGNOSIS — F33.1 MAJOR DEPRESSIVE DISORDER, RECURRENT, MODERATE: ICD-10-CM

## 2020-08-24 PROCEDURE — ZZZZZ: CPT

## 2020-09-21 ENCOUNTER — OUTPATIENT (OUTPATIENT)
Dept: OUTPATIENT SERVICES | Facility: HOSPITAL | Age: 40
LOS: 1 days | Discharge: HOME | End: 2020-09-21
Payer: MEDICAID

## 2020-09-21 DIAGNOSIS — Z98.890 OTHER SPECIFIED POSTPROCEDURAL STATES: Chronic | ICD-10-CM

## 2020-09-21 DIAGNOSIS — F33.1 MAJOR DEPRESSIVE DISORDER, RECURRENT, MODERATE: ICD-10-CM

## 2020-09-21 PROCEDURE — 99213 OFFICE O/P EST LOW 20 MIN: CPT | Mod: GC,95

## 2020-10-16 ENCOUNTER — OUTPATIENT (OUTPATIENT)
Dept: OUTPATIENT SERVICES | Facility: HOSPITAL | Age: 40
LOS: 1 days | Discharge: HOME | End: 2020-10-16
Payer: MEDICAID

## 2020-10-16 DIAGNOSIS — F33.1 MAJOR DEPRESSIVE DISORDER, RECURRENT, MODERATE: ICD-10-CM

## 2020-10-16 DIAGNOSIS — Z98.890 OTHER SPECIFIED POSTPROCEDURAL STATES: Chronic | ICD-10-CM

## 2020-10-16 PROCEDURE — 99213 OFFICE O/P EST LOW 20 MIN: CPT | Mod: GC

## 2020-11-13 ENCOUNTER — OUTPATIENT (OUTPATIENT)
Dept: OUTPATIENT SERVICES | Facility: HOSPITAL | Age: 40
LOS: 1 days | Discharge: HOME | End: 2020-11-13

## 2020-11-13 DIAGNOSIS — Z98.890 OTHER SPECIFIED POSTPROCEDURAL STATES: Chronic | ICD-10-CM

## 2020-11-13 DIAGNOSIS — F33.1 MAJOR DEPRESSIVE DISORDER, RECURRENT, MODERATE: ICD-10-CM

## 2020-12-21 ENCOUNTER — APPOINTMENT (OUTPATIENT)
Dept: PSYCHIATRY | Facility: CLINIC | Age: 40
End: 2020-12-21

## 2020-12-21 DIAGNOSIS — Z91.5 PERSONAL HISTORY OF SELF-HARM: ICD-10-CM

## 2020-12-21 DIAGNOSIS — Z86.59 PERSONAL HISTORY OF OTHER MENTAL AND BEHAVIORAL DISORDERS: ICD-10-CM

## 2020-12-21 DIAGNOSIS — Z86.39 PERSONAL HISTORY OF OTHER ENDOCRINE, NUTRITIONAL AND METABOLIC DISEASE: ICD-10-CM

## 2020-12-21 PROBLEM — Z00.00 ENCOUNTER FOR PREVENTIVE HEALTH EXAMINATION: Status: ACTIVE | Noted: 2020-12-21

## 2021-01-21 ENCOUNTER — OUTPATIENT (OUTPATIENT)
Dept: OUTPATIENT SERVICES | Facility: HOSPITAL | Age: 41
LOS: 1 days | Discharge: HOME | End: 2021-01-21

## 2021-01-21 ENCOUNTER — APPOINTMENT (OUTPATIENT)
Dept: PSYCHIATRY | Facility: CLINIC | Age: 41
End: 2021-01-21

## 2021-01-21 DIAGNOSIS — F33.1 MAJOR DEPRESSIVE DISORDER, RECURRENT, MODERATE: ICD-10-CM

## 2021-01-21 DIAGNOSIS — Z98.890 OTHER SPECIFIED POSTPROCEDURAL STATES: Chronic | ICD-10-CM

## 2021-01-21 RX ORDER — HYDROXYZINE HYDROCHLORIDE 50 MG/1
50 TABLET ORAL
Refills: 0 | Status: DISCONTINUED | COMMUNITY
Start: 2020-09-17 | End: 2021-01-21

## 2021-02-19 ENCOUNTER — APPOINTMENT (OUTPATIENT)
Dept: PSYCHIATRY | Facility: CLINIC | Age: 41
End: 2021-02-19

## 2021-02-19 ENCOUNTER — NON-APPOINTMENT (OUTPATIENT)
Age: 41
End: 2021-02-19

## 2021-03-12 ENCOUNTER — APPOINTMENT (OUTPATIENT)
Dept: PSYCHIATRY | Facility: CLINIC | Age: 41
End: 2021-03-12

## 2021-03-18 ENCOUNTER — OUTPATIENT (OUTPATIENT)
Dept: OUTPATIENT SERVICES | Facility: HOSPITAL | Age: 41
LOS: 1 days | Discharge: HOME | End: 2021-03-18

## 2021-03-18 ENCOUNTER — APPOINTMENT (OUTPATIENT)
Dept: PSYCHIATRY | Facility: CLINIC | Age: 41
End: 2021-03-18

## 2021-03-18 DIAGNOSIS — Z98.890 OTHER SPECIFIED POSTPROCEDURAL STATES: Chronic | ICD-10-CM

## 2021-03-18 DIAGNOSIS — F33.1 MAJOR DEPRESSIVE DISORDER, RECURRENT, MODERATE: ICD-10-CM

## 2021-03-18 RX ORDER — METFORMIN HYDROCHLORIDE 850 MG/1
1 TABLET ORAL
Qty: 60 | Refills: 1
Start: 2021-03-18 | End: 2021-08-08

## 2021-03-24 ENCOUNTER — EMERGENCY (EMERGENCY)
Facility: HOSPITAL | Age: 41
LOS: 0 days | Discharge: HOME | End: 2021-03-25
Attending: STUDENT IN AN ORGANIZED HEALTH CARE EDUCATION/TRAINING PROGRAM
Payer: MEDICAID

## 2021-03-24 VITALS
OXYGEN SATURATION: 97 % | RESPIRATION RATE: 14 BRPM | HEIGHT: 64 IN | WEIGHT: 139.99 LBS | TEMPERATURE: 98 F | HEART RATE: 90 BPM | DIASTOLIC BLOOD PRESSURE: 57 MMHG | SYSTOLIC BLOOD PRESSURE: 107 MMHG

## 2021-03-24 DIAGNOSIS — Z91.018 ALLERGY TO OTHER FOODS: ICD-10-CM

## 2021-03-24 DIAGNOSIS — K02.9 DENTAL CARIES, UNSPECIFIED: ICD-10-CM

## 2021-03-24 DIAGNOSIS — Z98.890 OTHER SPECIFIED POSTPROCEDURAL STATES: Chronic | ICD-10-CM

## 2021-03-24 DIAGNOSIS — K08.89 OTHER SPECIFIED DISORDERS OF TEETH AND SUPPORTING STRUCTURES: ICD-10-CM

## 2021-03-24 DIAGNOSIS — Z79.84 LONG TERM (CURRENT) USE OF ORAL HYPOGLYCEMIC DRUGS: ICD-10-CM

## 2021-03-24 DIAGNOSIS — Z79.899 OTHER LONG TERM (CURRENT) DRUG THERAPY: ICD-10-CM

## 2021-03-24 PROCEDURE — 99282 EMERGENCY DEPT VISIT SF MDM: CPT

## 2021-03-24 NOTE — ED PROVIDER NOTE - OBJECTIVE STATEMENT
Pt is a 40 year old male with PMH DM presents to ED with complaints of Dentalgia. Pain started x1 week ago. Pain is throbbing, located to Upper Rear R and Lower Rear R, worse with chewing. Denies any trouble with secretions, fevers, chills, dysphagia or odynophagia

## 2021-03-24 NOTE — ED PROVIDER NOTE - CLINICAL SUMMARY MEDICAL DECISION MAKING FREE TEXT BOX
40 yr old m that presents with dental pain. will send pt to dental clinic. given strict return precautions and pcp follow up.

## 2021-03-24 NOTE — ED ADULT NURSE NOTE - OBJECTIVE STATEMENT
Pt. came to ED c/o right upper dental pain x 2-3 days. Denies fever, chills, difficulty swallowing. Pain 8/10 in severity.

## 2021-03-24 NOTE — ED PROVIDER NOTE - PHYSICAL EXAMINATION
Physical Exam    Vital Signs: I have reviewed the initial vital signs.  Constitutional: well-nourished, appears stated age, no acute distress  Eyes: Conjunctiva pink, Sclera clear, PERRLA, EOMI.  Mouth: Uvula midline, no tonsillar erythema or swelling, no exudates. TTP of tooth #2 and #31 with notable damion. no abscess noted   Musculoskeletal: supple neck, no lower extremity edema, no midline tenderness  Integumentary: warm, dry, no rash  Neurologic: awake, alert, cranial nerves II-XII grossly intact, extremities’ motor and sensory functions grossly intact  Psychiatric: appropriate mood, appropriate affect

## 2021-03-24 NOTE — ED ADULT NURSE NOTE - NSIMPLEMENTINTERV_GEN_ALL_ED
Implemented All Universal Safety Interventions:  [place] to call system. Call bell, personal items and telephone within reach. Instruct patient to call for assistance. Room bathroom lighting operational. Non-slip footwear when patient is off stretcher. Physically safe environment: no spills, clutter or unnecessary equipment. Stretcher in lowest position, wheels locked, appropriate side rails in place.

## 2021-03-24 NOTE — ED PROVIDER NOTE - ATTENDING CONTRIBUTION TO CARE
40 yr old m w/ a pmh significant for DM who presents with dental pain. Pt states that the pain started 1 week ago. Pt states that the pain is mostly in the lower rear. Pt denies any trismus, drooling, nausea, vomiting, fevers, chills or any other complaints.     VITAL SIGNS: I have reviewed nursing notes and confirm.  CONSTITUTIONAL: non-toxic, well appearing  SKIN: no rash, no petechiae.  EYES: PERRL, EOMI, pink conjunctiva, anicteric  ENT: tongue midline, no exudates, MMM. TTP of tooth #2 and #31   NECK: Supple; no meningismus, no JVD  EXT: Normal ROM x4. No edema. No calves tenderness  NEURO: Alert, oriented. CN2-12 intact, equal strength bilaterally, nl gait.  PSYCH: Cooperative, appropriate.    a/p  40 yr old m that presents with dental pain. will send pt to dental clinic. given strict return precautions and pcp follow up.

## 2021-03-25 ENCOUNTER — EMERGENCY (EMERGENCY)
Facility: HOSPITAL | Age: 41
LOS: 0 days | Discharge: HOME | End: 2021-03-25
Attending: EMERGENCY MEDICINE | Admitting: EMERGENCY MEDICINE
Payer: MEDICAID

## 2021-03-25 VITALS
DIASTOLIC BLOOD PRESSURE: 61 MMHG | WEIGHT: 139.99 LBS | TEMPERATURE: 97 F | OXYGEN SATURATION: 99 % | SYSTOLIC BLOOD PRESSURE: 124 MMHG | HEIGHT: 64 IN | RESPIRATION RATE: 17 BRPM | HEART RATE: 83 BPM

## 2021-03-25 DIAGNOSIS — Z86.59 PERSONAL HISTORY OF OTHER MENTAL AND BEHAVIORAL DISORDERS: ICD-10-CM

## 2021-03-25 DIAGNOSIS — Z79.899 OTHER LONG TERM (CURRENT) DRUG THERAPY: ICD-10-CM

## 2021-03-25 DIAGNOSIS — Z87.891 PERSONAL HISTORY OF NICOTINE DEPENDENCE: ICD-10-CM

## 2021-03-25 DIAGNOSIS — K08.89 OTHER SPECIFIED DISORDERS OF TEETH AND SUPPORTING STRUCTURES: ICD-10-CM

## 2021-03-25 DIAGNOSIS — Z98.890 OTHER SPECIFIED POSTPROCEDURAL STATES: Chronic | ICD-10-CM

## 2021-03-25 DIAGNOSIS — Z86.69 PERSONAL HISTORY OF OTHER DISEASES OF THE NERVOUS SYSTEM AND SENSE ORGANS: ICD-10-CM

## 2021-03-25 DIAGNOSIS — Z79.84 LONG TERM (CURRENT) USE OF ORAL HYPOGLYCEMIC DRUGS: ICD-10-CM

## 2021-03-25 DIAGNOSIS — Z91.018 ALLERGY TO OTHER FOODS: ICD-10-CM

## 2021-03-25 PROCEDURE — 99282 EMERGENCY DEPT VISIT SF MDM: CPT

## 2021-03-25 NOTE — ED PROVIDER NOTE - NS ED ROS FT
Eyes:  No visual changes, eye pain or discharge.  ENMT:  + dental pain x 2 weeks with loose teeth. no drainage or bleeding, no swelling.   Cardiac:  No chest pain, SOB or edema. No chest pain with exertion.  Skin:  No skin rash.   Endocrine: No history of thyroid disease or diabetes.

## 2021-03-25 NOTE — ED PROVIDER NOTE - PHYSICAL EXAMINATION
VITALS:  I have reviewed the initial vital signs.  GENERAL: Well-developed, well-nourished, in no acute distress. Nontoxic.  HEENT: MMM, tolerating oral secretions. No trismus. + tenderness to right upper last molar and to tooth 24 & 25. No abscess. No floor of mouth or submandibular swelling. No tongue elevation.    NECK: supple w FROM.   PULM: Normal effort. No tachypnea or retractions. No stridor.   SKIN: Warm, dry.  NEURO: A&Ox3. Speech clear. CN II-XII intact. No focal deficits.

## 2021-03-25 NOTE — ED PROVIDER NOTE - OBJECTIVE STATEMENT
41 yo M presents with dental pain. Reports right upper molar loose and painful for 2 weeks. Also states that the front lower teeth have been loose. No drainage or bleeding. no fevers. no difficulty eating. last saw a dentist a few months ago. Has previously needed tooth extractions. Quit smoking 2 months ago.

## 2021-04-15 ENCOUNTER — OUTPATIENT (OUTPATIENT)
Dept: OUTPATIENT SERVICES | Facility: HOSPITAL | Age: 41
LOS: 1 days | Discharge: HOME | End: 2021-04-15

## 2021-04-15 ENCOUNTER — APPOINTMENT (OUTPATIENT)
Dept: PSYCHIATRY | Facility: CLINIC | Age: 41
End: 2021-04-15
Payer: MEDICAID

## 2021-04-15 DIAGNOSIS — F33.1 MAJOR DEPRESSIVE DISORDER, RECURRENT, MODERATE: ICD-10-CM

## 2021-04-15 DIAGNOSIS — Z98.890 OTHER SPECIFIED POSTPROCEDURAL STATES: Chronic | ICD-10-CM

## 2021-04-15 PROCEDURE — 99214 OFFICE O/P EST MOD 30 MIN: CPT | Mod: GC,95

## 2021-05-13 ENCOUNTER — APPOINTMENT (OUTPATIENT)
Dept: PSYCHIATRY | Facility: CLINIC | Age: 41
End: 2021-05-13

## 2021-05-13 ENCOUNTER — OUTPATIENT (OUTPATIENT)
Dept: OUTPATIENT SERVICES | Facility: HOSPITAL | Age: 41
LOS: 1 days | Discharge: HOME | End: 2021-05-13

## 2021-05-13 DIAGNOSIS — Z98.890 OTHER SPECIFIED POSTPROCEDURAL STATES: Chronic | ICD-10-CM

## 2021-05-13 DIAGNOSIS — F33.1 MAJOR DEPRESSIVE DISORDER, RECURRENT, MODERATE: ICD-10-CM

## 2021-06-10 ENCOUNTER — APPOINTMENT (OUTPATIENT)
Dept: PSYCHIATRY | Facility: CLINIC | Age: 41
End: 2021-06-10

## 2021-06-10 ENCOUNTER — OUTPATIENT (OUTPATIENT)
Dept: OUTPATIENT SERVICES | Facility: HOSPITAL | Age: 41
LOS: 1 days | Discharge: HOME | End: 2021-06-10

## 2021-06-10 DIAGNOSIS — F33.1 MAJOR DEPRESSIVE DISORDER, RECURRENT, MODERATE: ICD-10-CM

## 2021-06-10 DIAGNOSIS — Z98.890 OTHER SPECIFIED POSTPROCEDURAL STATES: Chronic | ICD-10-CM

## 2021-06-16 NOTE — ED ADULT TRIAGE NOTE - NS ED NURSE BANDS TYPE
COVID vaccine apt scheduled 4/10/21 @ 1:05 pm. Thanks.   
This patient told our MTM pharmacist that she is interested in COVID-19 vaccination on 4/10/21.    She has several risk factors.  Can you please contact her to schedule?  (Me Thee is not in clinic for a few days, or I would have messaged her)  
Name band;

## 2021-07-09 ENCOUNTER — APPOINTMENT (OUTPATIENT)
Dept: PSYCHIATRY | Facility: CLINIC | Age: 41
End: 2021-07-09

## 2021-07-09 ENCOUNTER — OUTPATIENT (OUTPATIENT)
Dept: OUTPATIENT SERVICES | Facility: HOSPITAL | Age: 41
LOS: 1 days | Discharge: HOME | End: 2021-07-09

## 2021-07-09 DIAGNOSIS — Z98.890 OTHER SPECIFIED POSTPROCEDURAL STATES: Chronic | ICD-10-CM

## 2021-07-09 DIAGNOSIS — F33.1 MAJOR DEPRESSIVE DISORDER, RECURRENT, MODERATE: ICD-10-CM

## 2021-07-09 RX ORDER — ARIPIPRAZOLE 2 MG/1
2 TABLET ORAL
Refills: 0 | Status: DISCONTINUED | COMMUNITY
Start: 2020-05-05 | End: 2021-07-09

## 2021-07-16 NOTE — DISCHARGE NOTE BEHAVIORAL HEALTH - NSBHDCCONDITIONFT_PSY_A_CORE
Left message on machine to return call for further triage.  See message sent to patient.  
Onset: 7/1  Location / description: patient went to [facility] for ear pain was given 10day amoxicillin which he has completed patient is still feeling ear pain and plugged sensation.  He will be traveling to [place] for the "[employer] requesting thst he is seen by Dr. LOERA before he travels  Precipitating Factors: ear infection 7/1/21  Pain Scale (1 - 10), 10 highest: 3/10 intermitent  Associated Symptoms: denies fever any ear drainage cough, runny nose   What  improves / worsens symptoms:unsure  Symptom specific medications: uses danis pot daily  Recent Care: 7/1/21 [facility]; 2/21 PCP      RN booked an appointment for Friday 7/16 with Dr. LOERA  
Ov today an darrived  
Spouse along with patient returning call. Call connected to triage.   
GOOD

## 2021-07-23 ENCOUNTER — OUTPATIENT (OUTPATIENT)
Dept: OUTPATIENT SERVICES | Facility: HOSPITAL | Age: 41
LOS: 1 days | Discharge: HOME | End: 2021-07-23

## 2021-07-23 ENCOUNTER — APPOINTMENT (OUTPATIENT)
Dept: PSYCHIATRY | Facility: CLINIC | Age: 41
End: 2021-07-23

## 2021-07-23 DIAGNOSIS — Z98.890 OTHER SPECIFIED POSTPROCEDURAL STATES: Chronic | ICD-10-CM

## 2021-07-23 DIAGNOSIS — F33.1 MAJOR DEPRESSIVE DISORDER, RECURRENT, MODERATE: ICD-10-CM

## 2021-08-06 ENCOUNTER — APPOINTMENT (OUTPATIENT)
Dept: PSYCHIATRY | Facility: CLINIC | Age: 41
End: 2021-08-06

## 2021-08-17 ENCOUNTER — APPOINTMENT (OUTPATIENT)
Dept: PSYCHIATRY | Facility: CLINIC | Age: 41
End: 2021-08-17

## 2021-08-17 ENCOUNTER — OUTPATIENT (OUTPATIENT)
Dept: OUTPATIENT SERVICES | Facility: HOSPITAL | Age: 41
LOS: 1 days | Discharge: HOME | End: 2021-08-17

## 2021-08-17 DIAGNOSIS — Z98.890 OTHER SPECIFIED POSTPROCEDURAL STATES: Chronic | ICD-10-CM

## 2021-08-17 DIAGNOSIS — F33.1 MAJOR DEPRESSIVE DISORDER, RECURRENT, MODERATE: ICD-10-CM

## 2021-08-21 ENCOUNTER — TRANSCRIPTION ENCOUNTER (OUTPATIENT)
Age: 41
End: 2021-08-21

## 2021-08-31 ENCOUNTER — APPOINTMENT (OUTPATIENT)
Dept: PSYCHIATRY | Facility: CLINIC | Age: 41
End: 2021-08-31

## 2021-09-14 ENCOUNTER — APPOINTMENT (OUTPATIENT)
Dept: PSYCHIATRY | Facility: CLINIC | Age: 41
End: 2021-09-14

## 2021-09-14 ENCOUNTER — OUTPATIENT (OUTPATIENT)
Dept: OUTPATIENT SERVICES | Facility: HOSPITAL | Age: 41
LOS: 1 days | Discharge: HOME | End: 2021-09-14

## 2021-09-14 DIAGNOSIS — F33.1 MAJOR DEPRESSIVE DISORDER, RECURRENT, MODERATE: ICD-10-CM

## 2021-09-14 DIAGNOSIS — Z98.890 OTHER SPECIFIED POSTPROCEDURAL STATES: Chronic | ICD-10-CM

## 2021-09-27 ENCOUNTER — APPOINTMENT (OUTPATIENT)
Dept: PSYCHIATRY | Facility: CLINIC | Age: 41
End: 2021-09-27

## 2021-10-05 ENCOUNTER — OUTPATIENT (OUTPATIENT)
Dept: OUTPATIENT SERVICES | Facility: HOSPITAL | Age: 41
LOS: 1 days | Discharge: HOME | End: 2021-10-05

## 2021-10-05 ENCOUNTER — APPOINTMENT (OUTPATIENT)
Dept: PSYCHIATRY | Facility: CLINIC | Age: 41
End: 2021-10-05

## 2021-10-05 DIAGNOSIS — F33.1 MAJOR DEPRESSIVE DISORDER, RECURRENT, MODERATE: ICD-10-CM

## 2021-10-05 DIAGNOSIS — Z98.890 OTHER SPECIFIED POSTPROCEDURAL STATES: Chronic | ICD-10-CM

## 2021-11-03 ENCOUNTER — APPOINTMENT (OUTPATIENT)
Dept: PSYCHIATRY | Facility: CLINIC | Age: 41
End: 2021-11-03

## 2021-11-03 ENCOUNTER — OUTPATIENT (OUTPATIENT)
Dept: OUTPATIENT SERVICES | Facility: HOSPITAL | Age: 41
LOS: 1 days | Discharge: HOME | End: 2021-11-03

## 2021-11-03 DIAGNOSIS — F33.1 MAJOR DEPRESSIVE DISORDER, RECURRENT, MODERATE: ICD-10-CM

## 2021-11-03 DIAGNOSIS — Z98.890 OTHER SPECIFIED POSTPROCEDURAL STATES: Chronic | ICD-10-CM

## 2021-11-19 ENCOUNTER — APPOINTMENT (OUTPATIENT)
Dept: PSYCHIATRY | Facility: CLINIC | Age: 41
End: 2021-11-19

## 2021-12-01 ENCOUNTER — APPOINTMENT (OUTPATIENT)
Dept: PSYCHIATRY | Facility: CLINIC | Age: 41
End: 2021-12-01

## 2021-12-20 ENCOUNTER — APPOINTMENT (OUTPATIENT)
Dept: PSYCHIATRY | Facility: CLINIC | Age: 41
End: 2021-12-20

## 2022-01-19 ENCOUNTER — OUTPATIENT (OUTPATIENT)
Dept: OUTPATIENT SERVICES | Facility: HOSPITAL | Age: 42
LOS: 1 days | Discharge: HOME | End: 2022-01-19

## 2022-01-19 ENCOUNTER — APPOINTMENT (OUTPATIENT)
Dept: PSYCHIATRY | Facility: CLINIC | Age: 42
End: 2022-01-19

## 2022-01-19 DIAGNOSIS — Z98.890 OTHER SPECIFIED POSTPROCEDURAL STATES: Chronic | ICD-10-CM

## 2022-01-19 DIAGNOSIS — F33.1 MAJOR DEPRESSIVE DISORDER, RECURRENT, MODERATE: ICD-10-CM

## 2022-01-26 ENCOUNTER — OUTPATIENT (OUTPATIENT)
Dept: OUTPATIENT SERVICES | Facility: HOSPITAL | Age: 42
LOS: 1 days | Discharge: HOME | End: 2022-01-26

## 2022-01-26 DIAGNOSIS — Z98.890 OTHER SPECIFIED POSTPROCEDURAL STATES: Chronic | ICD-10-CM

## 2022-02-16 ENCOUNTER — APPOINTMENT (OUTPATIENT)
Dept: PSYCHIATRY | Facility: CLINIC | Age: 42
End: 2022-02-16

## 2022-02-16 ENCOUNTER — OUTPATIENT (OUTPATIENT)
Dept: OUTPATIENT SERVICES | Facility: HOSPITAL | Age: 42
LOS: 1 days | Discharge: HOME | End: 2022-02-16

## 2022-02-16 DIAGNOSIS — Z98.890 OTHER SPECIFIED POSTPROCEDURAL STATES: Chronic | ICD-10-CM

## 2022-02-16 DIAGNOSIS — F33.1 MAJOR DEPRESSIVE DISORDER, RECURRENT, MODERATE: ICD-10-CM

## 2022-02-22 ENCOUNTER — EMERGENCY (EMERGENCY)
Facility: HOSPITAL | Age: 42
LOS: 0 days | Discharge: HOME | End: 2022-02-22
Attending: EMERGENCY MEDICINE | Admitting: EMERGENCY MEDICINE
Payer: MEDICAID

## 2022-02-22 VITALS
SYSTOLIC BLOOD PRESSURE: 127 MMHG | HEIGHT: 64 IN | TEMPERATURE: 98 F | HEART RATE: 75 BPM | DIASTOLIC BLOOD PRESSURE: 61 MMHG | RESPIRATION RATE: 17 BRPM | OXYGEN SATURATION: 99 %

## 2022-02-22 DIAGNOSIS — K08.89 OTHER SPECIFIED DISORDERS OF TEETH AND SUPPORTING STRUCTURES: ICD-10-CM

## 2022-02-22 DIAGNOSIS — F32.A DEPRESSION, UNSPECIFIED: ICD-10-CM

## 2022-02-22 DIAGNOSIS — Z98.890 OTHER SPECIFIED POSTPROCEDURAL STATES: Chronic | ICD-10-CM

## 2022-02-22 DIAGNOSIS — Z91.018 ALLERGY TO OTHER FOODS: ICD-10-CM

## 2022-02-22 DIAGNOSIS — Z79.84 LONG TERM (CURRENT) USE OF ORAL HYPOGLYCEMIC DRUGS: ICD-10-CM

## 2022-02-22 PROCEDURE — 99284 EMERGENCY DEPT VISIT MOD MDM: CPT

## 2022-02-22 NOTE — ED PROVIDER NOTE - OBJECTIVE STATEMENT
41y M pmh as listed presents for eval of dental pain. Pt has mild aching superior dental pain x1wk, aggravated with eating, no relieving factors. Denies fever, swelling, numbness

## 2022-02-22 NOTE — ED PROVIDER NOTE - CLINICAL SUMMARY MEDICAL DECISION MAKING FREE TEXT BOX
Pt aware of plan for transfer to dental clinic, being transferred, in no distress or airway compromise.

## 2022-02-22 NOTE — ED PROVIDER NOTE - NS ED ROS FT
Constitutional: (-) fever  Eyes/ENT: (+) dental pain, (-) blurry vision, (-) epistaxis  Cardiovascular: (-) chest pain, (-) syncope  Respiratory: (-) cough, (-) shortness of breath  Gastrointestinal: (-) vomiting, (-) diarrhea  Musculoskeletal: (-) neck pain, (-) back pain, (-) joint pain  Integumentary: (-) rash, (-) edema  Neurological: (-) headache, (-) altered mental status  Psychiatric: (-) hallucinations  Allergic/Immunologic: (-) pruritus

## 2022-02-22 NOTE — ED PROVIDER NOTE - PHYSICAL EXAMINATION
CONST: Well appearing in NAD  EYES: Sclera and conjunctiva clear.   ENT: Oropharynx poor dentition. No abscess or swelling. Uvula midline. No nasal discharge.   NECK: Non-tender, no meningeal signs. normal ROM. supple   CARD: S1 S2; No jvd  RESP: Equal BS B/L, No wheezes, rhonchi or rales. No distress  GI: Soft, non-tender, non-distended. normal BS  MS: Normal ROM in all extremities. pulses 2 +. no calf tenderness or swelling  SKIN: Warm, dry, no acute rashes. Good turgor  NEURO: A&Ox3

## 2022-02-22 NOTE — ED PROVIDER NOTE - ATTENDING CONTRIBUTION TO CARE
41-year-old male with past medical history of diabetes, anxiety, ex-smoker, presents with right front tooth discomfort, reports the tooth is loose.  Was seen at dental clinic on February 26 had extraction of the adjacent teeth, noted that this tooth was loose but has progressively been getting worse so returned today to go to dental clinic for reevaluation.  Patient is not currently on any antibiotics.  Denies any trauma. denies fever, chills, n/v, cp, sob, pleuritic chest pain, palpitations, diaphoresis, cough, drooling/secretions, trismus, neck swelling, neck stiffness, muffled/change in voice, dysphagia, odonoyphagia, drainage, trauma, weakness, numbness/tingling, sick contacts, recent travel or rash.        on exam: pt sitting on stretcher speaking full sentences, no sniffing position, no muffled or hot potatoe voice, no rash, no facial swelling, mmm, tooth # 8 loose, adjacent teeth not present, no surrounding fluctuance/induration, no halitosis, no trismus, no drooling/secretions, no pain to floor of mouth, no elevation to floor of mouth, no oropharyngeal edema, uvula midline, no PTA, no neck swelling- supple, non-tender, no anterior neck pain, RRR, radial pulses 2/4 b/l, ctabl w/ breath sounds present b/l, no wheezing or crackles, good air exchange, good respiratory effort, no accessory muscle use, no tachypnea, no stridor,  AAOx3. CN II-XII intact, no facial droop or slurring of speech. No focal deficits.

## 2022-02-22 NOTE — ED PROVIDER NOTE - CARE PLAN
Principal Discharge DX:	Dentalgia   1 Principal Discharge DX:	Dentalgia  Assessment and plan of treatment:	Plan: transfer to dental clinic.

## 2022-02-22 NOTE — CONSULT NOTE ADULT - SUBJECTIVE AND OBJECTIVE BOX
Patient is a 41y old  Male who presents with a chief complaint of pain and mobility on his upper front tooth       PAST MEDICAL & SURGICAL HISTORY:  Depression    Vertigo    Suicide attempt    History of inguinal hernia repair  Left      ( -  ) heart valve replacement  ( -  ) joint replacement  ( -  ) pregnancy    MEDICATIONS  (STANDING):    MEDICATIONS  (PRN):      Allergies    No Known Drug Allergies  strawberry (Rash)    Intolerances        FAMILY HISTORY:  No pertinent family history in first degree relatives        *SOCIAL HISTORY: (   ) Tobacco; (   ) ETOH    *Last Dental Visit:    Vital Signs Last 24 Hrs  T(C): 36.8 (22 Feb 2022 07:11), Max: 36.8 (22 Feb 2022 07:11)  T(F): 98.2 (22 Feb 2022 07:11), Max: 98.2 (22 Feb 2022 07:11)  HR: 75 (22 Feb 2022 07:11) (75 - 75)  BP: 127/61 (22 Feb 2022 07:11) (127/61 - 127/61)  BP(mean): --  RR: 17 (22 Feb 2022 07:11) (17 - 17)  SpO2: 99% (22 Feb 2022 07:11) (99% - 99%)      EOE:  TMJ ( -  ) clicks                     ( -  ) pops                     (  - ) crepitus             Mandible <<FROM>>             Facial bones and MOM <<grossly intact>>             ( -  ) trismus             ( -  ) lymphadenopathy             ( -  ) swelling             ( -  ) asymmetry             ( -  ) palpation             ( -  ) dyspnea             ( -  ) dysphagia             (  - ) loss of consciousness    IOE:  <<permanent/primary/mixed>> dentition: <<grossly intact>> OR <<multiple carious teeth>> OR <<multiple missing teeth>>           hard/soft palate:  (   ) palatal torus, <<No pathology noted>>           tongue/FOM <<No pathology noted>>           labial/buccal mucosa <<No pathology noted>>           ( +  ) percussion           ( -  ) palpation           ( -  ) swelling            (-   ) abscess           ( -  ) sinus tract      *DENTAL RADIOGRAPHS: Periapical x-rays taken during previous visit.      *ASSESSMENT: All the patient's teeth are periodontally involved with severe bone loss and during his Hygiene visit treatment planned for full mouth extraction and fabrication of complete maxillary and mandibular denture. Tooth # 8 has Class III mobility and causing pain and sensitivity.       *PLAN: Extraction of tooth #8     PROCEDURE:   Risk and benefit discussed as per OS sheet dated 7/13/00. Consent obtained. side site signed. Administered 2 carpule of 4% Septocaine with [id number] epinephrine via local infiltration. tooth #8 elevated and removed completely using forceps. Curetted and Irrigated the socket. Hemostasis achieved post-op instruction given. post-op X-ray negative.      RECOMMENDATIONS:  1) Dental F/U with outpatient dentist for comprehensive dental care.   2) If any difficulty swallowing/breathing, fever occur, return to ER.     Donald Carter, pager #[phone]

## 2022-03-16 ENCOUNTER — OUTPATIENT (OUTPATIENT)
Dept: OUTPATIENT SERVICES | Facility: HOSPITAL | Age: 42
LOS: 1 days | Discharge: HOME | End: 2022-03-16

## 2022-03-16 ENCOUNTER — APPOINTMENT (OUTPATIENT)
Dept: PSYCHIATRY | Facility: CLINIC | Age: 42
End: 2022-03-16

## 2022-03-16 DIAGNOSIS — Z98.890 OTHER SPECIFIED POSTPROCEDURAL STATES: Chronic | ICD-10-CM

## 2022-03-16 DIAGNOSIS — F33.1 MAJOR DEPRESSIVE DISORDER, RECURRENT, MODERATE: ICD-10-CM

## 2022-04-12 ENCOUNTER — OUTPATIENT (OUTPATIENT)
Dept: OUTPATIENT SERVICES | Facility: HOSPITAL | Age: 42
LOS: 1 days | Discharge: HOME | End: 2022-04-12

## 2022-04-12 ENCOUNTER — APPOINTMENT (OUTPATIENT)
Dept: PSYCHIATRY | Facility: CLINIC | Age: 42
End: 2022-04-12

## 2022-04-12 DIAGNOSIS — Z98.890 OTHER SPECIFIED POSTPROCEDURAL STATES: Chronic | ICD-10-CM

## 2022-04-12 DIAGNOSIS — F33.1 MAJOR DEPRESSIVE DISORDER, RECURRENT, MODERATE: ICD-10-CM

## 2022-04-26 ENCOUNTER — OUTPATIENT (OUTPATIENT)
Dept: OUTPATIENT SERVICES | Facility: HOSPITAL | Age: 42
LOS: 1 days | Discharge: HOME | End: 2022-04-26

## 2022-04-26 ENCOUNTER — APPOINTMENT (OUTPATIENT)
Age: 42
End: 2022-04-26

## 2022-04-26 DIAGNOSIS — Z98.890 OTHER SPECIFIED POSTPROCEDURAL STATES: Chronic | ICD-10-CM

## 2022-04-26 DIAGNOSIS — F33.1 MAJOR DEPRESSIVE DISORDER, RECURRENT, MODERATE: ICD-10-CM

## 2022-05-02 ENCOUNTER — EMERGENCY (EMERGENCY)
Facility: HOSPITAL | Age: 42
LOS: 0 days | Discharge: HOME | End: 2022-05-02
Attending: EMERGENCY MEDICINE | Admitting: EMERGENCY MEDICINE
Payer: MEDICAID

## 2022-05-02 VITALS
HEART RATE: 100 BPM | RESPIRATION RATE: 19 BRPM | OXYGEN SATURATION: 96 % | DIASTOLIC BLOOD PRESSURE: 75 MMHG | SYSTOLIC BLOOD PRESSURE: 150 MMHG | HEIGHT: 64 IN | TEMPERATURE: 98 F

## 2022-05-02 DIAGNOSIS — Z87.19 PERSONAL HISTORY OF OTHER DISEASES OF THE DIGESTIVE SYSTEM: ICD-10-CM

## 2022-05-02 DIAGNOSIS — R13.10 DYSPHAGIA, UNSPECIFIED: ICD-10-CM

## 2022-05-02 DIAGNOSIS — Z91.52 PERSONAL HISTORY OF NONSUICIDAL SELF-HARM: ICD-10-CM

## 2022-05-02 DIAGNOSIS — F32.A DEPRESSION, UNSPECIFIED: ICD-10-CM

## 2022-05-02 DIAGNOSIS — R10.10 UPPER ABDOMINAL PAIN, UNSPECIFIED: ICD-10-CM

## 2022-05-02 DIAGNOSIS — R73.9 HYPERGLYCEMIA, UNSPECIFIED: ICD-10-CM

## 2022-05-02 DIAGNOSIS — Z91.018 ALLERGY TO OTHER FOODS: ICD-10-CM

## 2022-05-02 DIAGNOSIS — Z98.890 OTHER SPECIFIED POSTPROCEDURAL STATES: Chronic | ICD-10-CM

## 2022-05-02 DIAGNOSIS — Z79.84 LONG TERM (CURRENT) USE OF ORAL HYPOGLYCEMIC DRUGS: ICD-10-CM

## 2022-05-02 LAB
ALBUMIN SERPL ELPH-MCNC: 4.8 G/DL — SIGNIFICANT CHANGE UP (ref 3.5–5.2)
ALP SERPL-CCNC: 167 U/L — HIGH (ref 30–115)
ALT FLD-CCNC: 26 U/L — SIGNIFICANT CHANGE UP (ref 0–41)
ANION GAP SERPL CALC-SCNC: 13 MMOL/L — SIGNIFICANT CHANGE UP (ref 7–14)
AST SERPL-CCNC: 20 U/L — SIGNIFICANT CHANGE UP (ref 0–41)
BASOPHILS # BLD AUTO: 0.04 K/UL — SIGNIFICANT CHANGE UP (ref 0–0.2)
BASOPHILS NFR BLD AUTO: 0.4 % — SIGNIFICANT CHANGE UP (ref 0–1)
BILIRUB SERPL-MCNC: 0.5 MG/DL — SIGNIFICANT CHANGE UP (ref 0.2–1.2)
BUN SERPL-MCNC: 12 MG/DL — SIGNIFICANT CHANGE UP (ref 10–20)
CALCIUM SERPL-MCNC: 10 MG/DL — SIGNIFICANT CHANGE UP (ref 8.5–10.1)
CHLORIDE SERPL-SCNC: 99 MMOL/L — SIGNIFICANT CHANGE UP (ref 98–110)
CO2 SERPL-SCNC: 24 MMOL/L — SIGNIFICANT CHANGE UP (ref 17–32)
CREAT SERPL-MCNC: 0.9 MG/DL — SIGNIFICANT CHANGE UP (ref 0.7–1.5)
EGFR: 109 ML/MIN/1.73M2 — SIGNIFICANT CHANGE UP
EOSINOPHIL # BLD AUTO: 0.41 K/UL — SIGNIFICANT CHANGE UP (ref 0–0.7)
EOSINOPHIL NFR BLD AUTO: 4.5 % — SIGNIFICANT CHANGE UP (ref 0–8)
GLUCOSE SERPL-MCNC: 285 MG/DL — HIGH (ref 70–99)
HCT VFR BLD CALC: 44 % — SIGNIFICANT CHANGE UP (ref 42–52)
HGB BLD-MCNC: 15.1 G/DL — SIGNIFICANT CHANGE UP (ref 14–18)
IMM GRANULOCYTES NFR BLD AUTO: 0.4 % — HIGH (ref 0.1–0.3)
LACTATE SERPL-SCNC: 1.1 MMOL/L — SIGNIFICANT CHANGE UP (ref 0.7–2)
LIDOCAIN IGE QN: 28 U/L — SIGNIFICANT CHANGE UP (ref 7–60)
LYMPHOCYTES # BLD AUTO: 1.81 K/UL — SIGNIFICANT CHANGE UP (ref 1.2–3.4)
LYMPHOCYTES # BLD AUTO: 19.8 % — LOW (ref 20.5–51.1)
MCHC RBC-ENTMCNC: 29.4 PG — SIGNIFICANT CHANGE UP (ref 27–31)
MCHC RBC-ENTMCNC: 34.3 G/DL — SIGNIFICANT CHANGE UP (ref 32–37)
MCV RBC AUTO: 85.8 FL — SIGNIFICANT CHANGE UP (ref 80–94)
MONOCYTES # BLD AUTO: 0.44 K/UL — SIGNIFICANT CHANGE UP (ref 0.1–0.6)
MONOCYTES NFR BLD AUTO: 4.8 % — SIGNIFICANT CHANGE UP (ref 1.7–9.3)
NEUTROPHILS # BLD AUTO: 6.41 K/UL — SIGNIFICANT CHANGE UP (ref 1.4–6.5)
NEUTROPHILS NFR BLD AUTO: 70.1 % — SIGNIFICANT CHANGE UP (ref 42.2–75.2)
NRBC # BLD: 0 /100 WBCS — SIGNIFICANT CHANGE UP (ref 0–0)
PLATELET # BLD AUTO: 279 K/UL — SIGNIFICANT CHANGE UP (ref 130–400)
POTASSIUM SERPL-MCNC: 4.1 MMOL/L — SIGNIFICANT CHANGE UP (ref 3.5–5)
POTASSIUM SERPL-SCNC: 4.1 MMOL/L — SIGNIFICANT CHANGE UP (ref 3.5–5)
PROT SERPL-MCNC: 7.2 G/DL — SIGNIFICANT CHANGE UP (ref 6–8)
RBC # BLD: 5.13 M/UL — SIGNIFICANT CHANGE UP (ref 4.7–6.1)
RBC # FLD: 11.9 % — SIGNIFICANT CHANGE UP (ref 11.5–14.5)
SODIUM SERPL-SCNC: 136 MMOL/L — SIGNIFICANT CHANGE UP (ref 135–146)
TROPONIN T SERPL-MCNC: <0.01 NG/ML — SIGNIFICANT CHANGE UP
WBC # BLD: 9.15 K/UL — SIGNIFICANT CHANGE UP (ref 4.8–10.8)
WBC # FLD AUTO: 9.15 K/UL — SIGNIFICANT CHANGE UP (ref 4.8–10.8)

## 2022-05-02 PROCEDURE — 99285 EMERGENCY DEPT VISIT HI MDM: CPT

## 2022-05-02 PROCEDURE — 93010 ELECTROCARDIOGRAM REPORT: CPT

## 2022-05-02 PROCEDURE — 71046 X-RAY EXAM CHEST 2 VIEWS: CPT | Mod: 26

## 2022-05-02 RX ORDER — FAMOTIDINE 10 MG/ML
20 INJECTION INTRAVENOUS ONCE
Refills: 0 | Status: COMPLETED | OUTPATIENT
Start: 2022-05-02 | End: 2022-05-02

## 2022-05-02 RX ADMIN — FAMOTIDINE 20 MILLIGRAM(S): 10 INJECTION INTRAVENOUS at 11:04

## 2022-05-02 NOTE — ED ADULT NURSE NOTE - OBJECTIVE STATEMENT
States over past 1.5 months when swallowing feels like food get stuck.  States no associated nausea or vomiting. Also reports increased fullness of upper abd.

## 2022-05-02 NOTE — ED PROVIDER NOTE - ATTENDING APP SHARED VISIT CONTRIBUTION OF CARE
41-year-old male past medical history depression presenting for evaluation of feeling of food getting stuck when he eats for the past month.  Denies any pain in the chest, dizziness/lightheadedness, nausea/ vomiting, weight loss, fever/ chills, black or bloody stools or any other additional complaints.  His symptoms are not worsening, came today to the ED "just to get checked out". He has not tried anything to make the symptoms feel better.  Stopped smoking several months ago, denies any heavy alcohol use, no drug use.  There is no family history of early CAD or sudden cardiac death.  Well-appearing well-nourished male in NAD, head AT/NC, PERRL,  supple neck , nml work of breathing, lungs CTA b/l, equal air entry, speaking full sentences, RRR, well-perfused extremities, abdomen soft, NT/ND, BS present in all quadrants, no leg edema or unilateral calf swelling, A&Ox3, no gross neuro deficits, nml mood and affect.  Plan: Labs, chest x-ray, EKG.  Patient was advised to follow-up with his primary care doctor for further care.  He verbalized understanding and is amenable with the plan.

## 2022-05-02 NOTE — ED PROVIDER NOTE - PROVIDER TOKENS
PROVIDER:[TOKEN:[[id number]],FOLLOWUP:[1-3 Days]],PROVIDER:[TOKEN:[[id number]],FOLLOWUP:[1-3 Days]],PROVIDER:[TOKEN:[[id number]],FOLLOWUP:[1-3 Days]]

## 2022-05-02 NOTE — ED PROVIDER NOTE - NS ED ROS FT
Constitutional: See HPI.  Eyes: No visual changes, eye pain or discharge.   ENMT: No hearing changes, pain, discharge or infections.   Cardiac: No SOB or edema. No chest pain with exertion.  Respiratory: No cough or respiratory distress.   GI: + abd fullness. No nausea, vomiting, or diarrhea.  : No dysuria, frequency or burning. No Discharge  MS: No myalgia, muscle weakness, joint pain or back pain.  Neuro: No headache or weakness.   Skin: No skin rash.  Except as documented in the HPI, all other systems are negative.

## 2022-05-02 NOTE — ED PROVIDER NOTE - INTERNATIONAL TRAVEL
Chief Complaint   Patient presents with     COMPREHENSIVE EYE EXAM      Accompanied by self  Last Eye Exam: 1 year ago  Dilated Previously: Yes    What are you currently using to see?  glasses       Distance Vision Acuity: Noticed gradual change in both eyes    Near Vision Acuity: Not satisfied     Eye Comfort: good  Do you use eye drops? : No  Occupation or Hobbies: retired    Kacie Eden, Optometric Tech          Medical, surgical and family histories reviewed and updated 6/8/2017.       OBJECTIVE: See Ophthalmology exam    ASSESSMENT:    ICD-10-CM    1. Hyperopia of both eyes with astigmatism and presbyopia H52.03 EYE EXAM (SIMPLE-NONBILLABLE)    H52.203 REFRACTION    H52.4    2. Cataracts, both eyes H26.9 EYE EXAM (SIMPLE-NONBILLABLE)      PLAN:   A final glasses prescription was given.  Allow time for adaptation.  The glasses may cause dizziness and affect depth perception for awhile.  Monitor cataracts by having yearly exams.  Wear sunglasses when outside.  Return to clinic 1 year for Comprehensive Vision Exam      Lauryn Gardner O.D  [address]  [place]  [address]    [phone]        '     No

## 2022-05-02 NOTE — ED PROVIDER NOTE - NSFOLLOWUPCLINICS_GEN_ALL_ED_FT
[facility]  Medicine  [address], [place]   Phone: [phone]  Fax:   Follow Up Time: 1-3 Days

## 2022-05-02 NOTE — ED PROVIDER NOTE - PHYSICAL EXAMINATION
CONST: Well appearing in NAD  EYES: Sclera and conjunctiva clear.  CARD: Normal S1 S2; Normal rate and rhythm  RESP: Equal BS B/L, No wheezes, rhonchi or rales. No distress  GI: Soft, non-tender, non-distended, no CVA tenderness  MS: Normal ROM in all extremities. No edema of lower extremities, no calf pain, radial pulses 2+ bilaterally  SKIN: Warm, dry, no acute rashes. Good turgor  NEURO: A&Ox3, No focal deficits. Strength 5/5 with no sensory deficits

## 2022-05-02 NOTE — ED ADULT NURSE NOTE - CAS DISCH CONDITION
Bladder prolapse, female, acquired    Bladder tumor    CAD (coronary artery disease)    Diabetes    High cholesterol    Hypertension    Neuropathy    Stented coronary artery  Mi in 2014, s/p stent of right coronary artery
Improved

## 2022-05-02 NOTE — ED PROVIDER NOTE - CARE PROVIDER_API CALL
Alexandra Hampton)  Gastroenterology  [address] [place] [address]  Phone: [phone]  Fax: [phone]  Follow Up Time: 1-3 Days    Adama Terry)  Gastroenterology; Internal Medicine  [address] [place] [address]  Phone: [phone]  Fax: [phone]  Follow Up Time: 1-3 [place] [address]  Phone: [phone]  Fax: [phone]  Follow Up Time: 1-3 Days

## 2022-05-02 NOTE — ED PROVIDER NOTE - PATIENT PORTAL LINK FT
You can access the FollowMyHealth Patient Portal offered by [facility] by registering at the following website: [email or web address]. By joining [facility]’s FollowMyHealth portal, you will also be able to view your health information using other applications (apps) compatible with our system.

## 2022-05-02 NOTE — ED PROVIDER NOTE - PROGRESS NOTE DETAILS
EP: Patient appears very well, to ECG is negative, chemistry shows elevated glucose.  Patient was advised to follow-up with his PMD this week for further treatment. Patient to be discharged from ED. Any available test results were discussed with patient and/or family. Verbal instructions given, including instructions to return to ED immediately for any new, worsening, or concerning symptoms. Patient endorsed understanding. Written discharge instructions additionally given, including follow-up plan.  Patient was given opportunity to ask questions. EP: Patient appears very well, to ECG is negative, chemistry shows elevated glucose.  Patient was advised to follow-up with his PMD this week for further treatment. Patient to be discharged from ED. Any available test results were discussed with patient and/or family. Verbal instructions given, including instructions to return to ED immediately for any new, worsening, or concerning symptoms. Patient endorsed understanding. Written discharge instructions additionally given, including follow-up plan.  Patient was given opportunity to ask questions.  In addition, he was also advised to follow-up with gastroenterology, contact information was provided.

## 2022-05-02 NOTE — ED ADULT NURSE NOTE - PRO INTERPRETER NEED 2
Discussed patient's results w her daughter Charmaine and son Bib (POA-M).  -Urine does not indicate UTI at this time.  -D-dimer is mildly elevated; she has several other possible causes for this result (CHF, CAD, A Fib), so this result is not meaningful.  -Uric acid is elevated. No known hx gout. Results to Nephrologist Dr Olmos, who ordered it, as well as all the other lab results.  -Magnesium and phosphorus- normal.   -Cr= 1.54, eGFR =30, essentially stable.  -Blood count- H/H stable  -Glyco = 6.4, stable; watch for sx's of hypoglycemia.  -BNP - elevated but this seems to be her baseline, comparing to previous numbers.  Awaiting PTH result, then sending all to both Cardiologist and Nephrologist.   English

## 2022-05-02 NOTE — ED PROVIDER NOTE - NS ED ATTENDING STATEMENT MOD
This was a shared visit with the POLO. I reviewed and verified the documentation and independently performed the documented:

## 2022-05-02 NOTE — ED PROVIDER NOTE - WR ORDER STATUS 1
As certified below, I, or a nurse practitioner or physician assistant working with me, had a face-to-face encounter that meets the physician face-to-face encounter requirements.
Performed

## 2022-05-02 NOTE — ED PROVIDER NOTE - CARE PROVIDERS DIRECT ADDRESSES
[email or web address]

## 2022-05-02 NOTE — ED PROVIDER NOTE - NSFOLLOWUPINSTRUCTIONS_ED_ALL_ED_FT
Dysphagia    Swallowing problems (dysphagia) occur when solids and liquids seem to stick in your throat on the way down to your stomach, or the food takes longer to get to the stomach. Other symptoms include regurgitating food, noises coming from the throat, chest discomfort with swallowing, and a feeling of fullness or the feeling of something being stuck in your throat when swallowing. When blockage in your throat is complete, it may be associated with drooling.    CAUSES  Problems with swallowing may occur because of problems with the muscles. The food cannot be propelled in the usual manner into your stomach. You may have ulcers, scar tissue, or inflammation in the tube down which food travels from your mouth to your stomach (esophagus), which blocks food from passing normally into the stomach. Causes of inflammation include:    Acid reflux from your stomach into your esophagus.  Infection.  Radiation treatment for cancer.  Medicines taken without enough fluids to wash them down into your stomach.    You may have nerve problems that prevent signals from being sent to the muscles of your esophagus to contract and move your food down to your stomach. Globus pharyngeus is a relatively common problem in which there is a sense of an obstruction or difficulty in swallowing, without any physical abnormalities of the swallowing passages being found. This problem usually improves over time with reassurance and testing to rule out other causes.    DIAGNOSIS  Dysphagia can be diagnosed and its cause can be determined by tests in which you swallow a white substance that helps illuminate the inside of your throat (contrast medium) while X-rays are taken. Sometimes a flexible telescope that is inserted down your throat (endoscopy) to look at your esophagus and stomach is used.    TREATMENT  If the dysphagia is caused by acid reflux or infection, medicines may be used.  If the dysphagia is caused by problems with your swallowing muscles, swallowing therapy may be used to help you strengthen your swallowing muscles.  If the dysphagia is caused by a blockage or mass, procedures to remove the blockage may be done.    HOME CARE INSTRUCTIONS  Try to eat soft food that is easier to swallow and check your weight on a daily basis to be sure that it is not decreasing.  Be sure to drink liquids when sitting upright (not lying down).    SEEK MEDICAL CARE IF:  You are losing weight because you are unable to swallow.  You are coughing when you drink liquids (aspiration).  You are coughing up partially digested food.    SEEK IMMEDIATE MEDICAL CARE IF:  You are unable to swallow your own saliva?.  You are having shortness of breath or a fever, or both.?  You have a hoarse voice along with difficulty swallowing.    MAKE SURE YOU:  Understand these instructions.  Will watch your condition.  Will get help right away if you are not doing well or get worse.    ADDITIONAL NOTES AND INSTRUCTIONS    Please follow up with your Primary MD in 24-48 hr.  Seek immediate medical care for any new/worsening signs or symptoms.    Follow up with your primary medical doctor in 1-2 days    Hyperglycemia    Hyperglycemia occurs when the glucose (a sugar) level in your blood is too high. Symptoms include increased urination, increased thirst, a dry mouth, or changes in appetite. If started on a medication, take exactly as prescribed by your health care professional. Maintain a healthy lifestyle and follow up with your primary care physician.    SEEK IMMEDIATE MEDICAL CARE IF YOU HAVE THE FOLLOWING SYMPTOMS: shortness of breath, change in mental status, nausea or vomiting, fruity smell to your breath, or any signs of dehydration.    Follow up with your primary medical doctor in 1-2 days

## 2022-05-02 NOTE — ED PROVIDER NOTE - OBJECTIVE STATEMENT
41 y.o male presents to the ED for evaluation.  States over past 1.5 months when swallowing feels like food get stuck.  States no associated nausea or vomiting. Also reports increased fullness of upper abd.  NO associated abd pain, weight loss, changes in bowel habits.  Also wanted " to check out my heart."  No chest pain, dyspnea, orthopnea, edema of lower extremities.  NO further complaints.  has not f/u with his PMD " in some time."  Never seen by GI.

## 2022-05-09 ENCOUNTER — NON-APPOINTMENT (OUTPATIENT)
Age: 42
End: 2022-05-09

## 2022-05-12 ENCOUNTER — APPOINTMENT (OUTPATIENT)
Dept: PSYCHIATRY | Facility: CLINIC | Age: 42
End: 2022-05-12

## 2022-05-25 ENCOUNTER — APPOINTMENT (OUTPATIENT)
Dept: PSYCHIATRY | Facility: CLINIC | Age: 42
End: 2022-05-25

## 2022-05-25 ENCOUNTER — OUTPATIENT (OUTPATIENT)
Dept: OUTPATIENT SERVICES | Facility: HOSPITAL | Age: 42
LOS: 1 days | Discharge: HOME | End: 2022-05-25

## 2022-05-25 DIAGNOSIS — F33.1 MAJOR DEPRESSIVE DISORDER, RECURRENT, MODERATE: ICD-10-CM

## 2022-05-25 DIAGNOSIS — Z98.890 OTHER SPECIFIED POSTPROCEDURAL STATES: Chronic | ICD-10-CM

## 2022-05-26 ENCOUNTER — APPOINTMENT (OUTPATIENT)
Dept: PSYCHIATRY | Facility: CLINIC | Age: 42
End: 2022-05-26

## 2022-06-07 ENCOUNTER — APPOINTMENT (OUTPATIENT)
Dept: PSYCHIATRY | Facility: CLINIC | Age: 42
End: 2022-06-07

## 2022-06-07 ENCOUNTER — OUTPATIENT (OUTPATIENT)
Dept: OUTPATIENT SERVICES | Facility: HOSPITAL | Age: 42
LOS: 1 days | Discharge: HOME | End: 2022-06-07

## 2022-06-07 DIAGNOSIS — F33.1 MAJOR DEPRESSIVE DISORDER, RECURRENT, MODERATE: ICD-10-CM

## 2022-06-07 DIAGNOSIS — Z98.890 OTHER SPECIFIED POSTPROCEDURAL STATES: Chronic | ICD-10-CM

## 2022-06-22 ENCOUNTER — OUTPATIENT (OUTPATIENT)
Dept: OUTPATIENT SERVICES | Facility: HOSPITAL | Age: 42
LOS: 1 days | Discharge: HOME | End: 2022-06-22

## 2022-06-22 ENCOUNTER — APPOINTMENT (OUTPATIENT)
Dept: PSYCHIATRY | Facility: CLINIC | Age: 42
End: 2022-06-22

## 2022-06-22 DIAGNOSIS — Z98.890 OTHER SPECIFIED POSTPROCEDURAL STATES: Chronic | ICD-10-CM

## 2022-06-22 DIAGNOSIS — F33.1 MAJOR DEPRESSIVE DISORDER, RECURRENT, MODERATE: ICD-10-CM

## 2022-07-11 ENCOUNTER — OUTPATIENT (OUTPATIENT)
Dept: OUTPATIENT SERVICES | Facility: HOSPITAL | Age: 42
LOS: 1 days | Discharge: HOME | End: 2022-07-11

## 2022-07-11 DIAGNOSIS — Z98.890 OTHER SPECIFIED POSTPROCEDURAL STATES: Chronic | ICD-10-CM

## 2022-07-13 ENCOUNTER — OUTPATIENT (OUTPATIENT)
Dept: OUTPATIENT SERVICES | Facility: HOSPITAL | Age: 42
LOS: 1 days | Discharge: HOME | End: 2022-07-13

## 2022-07-13 DIAGNOSIS — Z98.890 OTHER SPECIFIED POSTPROCEDURAL STATES: Chronic | ICD-10-CM

## 2022-07-18 ENCOUNTER — OUTPATIENT (OUTPATIENT)
Dept: OUTPATIENT SERVICES | Facility: HOSPITAL | Age: 42
LOS: 1 days | Discharge: HOME | End: 2022-07-18

## 2022-07-18 ENCOUNTER — APPOINTMENT (OUTPATIENT)
Dept: PSYCHIATRY | Facility: CLINIC | Age: 42
End: 2022-07-18

## 2022-07-18 DIAGNOSIS — F33.1 MAJOR DEPRESSIVE DISORDER, RECURRENT, MODERATE: ICD-10-CM

## 2022-07-18 DIAGNOSIS — Z98.890 OTHER SPECIFIED POSTPROCEDURAL STATES: Chronic | ICD-10-CM

## 2022-07-18 RX ORDER — AMOXICILLIN AND CLAVULANATE POTASSIUM 500; 125 MG/1; 1/1
TABLET, FILM COATED ORAL
Refills: 0 | Status: DISCONTINUED | COMMUNITY
End: 2022-07-18

## 2022-07-18 RX ORDER — ARIPIPRAZOLE 5 MG/1
5 TABLET ORAL DAILY
Qty: 30 | Refills: 2 | Status: DISCONTINUED | COMMUNITY
Start: 2020-05-05 | End: 2022-07-18

## 2022-07-18 RX ORDER — CITALOPRAM HYDROBROMIDE 40 MG/1
40 TABLET, FILM COATED ORAL DAILY
Qty: 30 | Refills: 2 | Status: DISCONTINUED | COMMUNITY
Start: 2020-12-12 | End: 2022-07-18

## 2022-07-18 RX ORDER — HYDROXYZINE HYDROCHLORIDE 50 MG/1
50 TABLET ORAL AT BEDTIME
Qty: 14 | Refills: 0 | Status: DISCONTINUED | COMMUNITY
Start: 2021-10-05 | End: 2022-07-18

## 2022-07-20 ENCOUNTER — OUTPATIENT (OUTPATIENT)
Dept: OUTPATIENT SERVICES | Facility: HOSPITAL | Age: 42
LOS: 1 days | Discharge: HOME | End: 2022-07-20

## 2022-07-20 DIAGNOSIS — Z98.890 OTHER SPECIFIED POSTPROCEDURAL STATES: Chronic | ICD-10-CM

## 2022-07-27 ENCOUNTER — OUTPATIENT (OUTPATIENT)
Dept: OUTPATIENT SERVICES | Facility: HOSPITAL | Age: 42
LOS: 1 days | Discharge: HOME | End: 2022-07-27

## 2022-07-27 DIAGNOSIS — Z98.890 OTHER SPECIFIED POSTPROCEDURAL STATES: Chronic | ICD-10-CM

## 2022-08-03 ENCOUNTER — OUTPATIENT (OUTPATIENT)
Dept: OUTPATIENT SERVICES | Facility: HOSPITAL | Age: 42
LOS: 1 days | Discharge: HOME | End: 2022-08-03

## 2022-08-03 DIAGNOSIS — Z98.890 OTHER SPECIFIED POSTPROCEDURAL STATES: Chronic | ICD-10-CM

## 2022-08-05 ENCOUNTER — OUTPATIENT (OUTPATIENT)
Dept: OUTPATIENT SERVICES | Facility: HOSPITAL | Age: 42
LOS: 1 days | Discharge: HOME | End: 2022-08-05

## 2022-08-05 DIAGNOSIS — Z98.890 OTHER SPECIFIED POSTPROCEDURAL STATES: Chronic | ICD-10-CM

## 2022-08-17 ENCOUNTER — APPOINTMENT (OUTPATIENT)
Dept: PSYCHIATRY | Facility: CLINIC | Age: 42
End: 2022-08-17

## 2022-08-17 ENCOUNTER — OUTPATIENT (OUTPATIENT)
Dept: OUTPATIENT SERVICES | Facility: HOSPITAL | Age: 42
LOS: 1 days | Discharge: HOME | End: 2022-08-17

## 2022-08-17 DIAGNOSIS — F33.1 MAJOR DEPRESSIVE DISORDER, RECURRENT, MODERATE: ICD-10-CM

## 2022-08-17 DIAGNOSIS — F41.9 MAJOR DEPRESSIVE DISORDER, RECURRENT, MODERATE: ICD-10-CM

## 2022-08-17 DIAGNOSIS — F33.42 MAJOR DEPRESSIVE DISORDER, RECURRENT, IN FULL REMISSION: ICD-10-CM

## 2022-08-17 DIAGNOSIS — Z98.890 OTHER SPECIFIED POSTPROCEDURAL STATES: Chronic | ICD-10-CM

## 2022-09-14 ENCOUNTER — OUTPATIENT (OUTPATIENT)
Dept: OUTPATIENT SERVICES | Facility: HOSPITAL | Age: 42
LOS: 1 days | Discharge: HOME | End: 2022-09-14

## 2022-09-14 ENCOUNTER — APPOINTMENT (OUTPATIENT)
Dept: PSYCHIATRY | Facility: CLINIC | Age: 42
End: 2022-09-14

## 2022-09-14 DIAGNOSIS — Z98.890 OTHER SPECIFIED POSTPROCEDURAL STATES: Chronic | ICD-10-CM

## 2022-09-14 DIAGNOSIS — F33.42 MAJOR DEPRESSIVE DISORDER, RECURRENT, IN FULL REMISSION: ICD-10-CM

## 2022-10-13 ENCOUNTER — APPOINTMENT (OUTPATIENT)
Dept: PSYCHIATRY | Facility: CLINIC | Age: 42
End: 2022-10-13

## 2022-11-03 ENCOUNTER — NON-APPOINTMENT (OUTPATIENT)
Age: 42
End: 2022-11-03

## 2022-11-04 NOTE — DISCUSSION/SUMMARY
[FreeTextEntry1] : Patient missed last appointment\par Patient called to reschedule,\par called [phone](house),left  vm with callback, called  [phone] (cell), left vm with callback as well. \par will continue to outreach

## 2022-11-07 ENCOUNTER — OUTPATIENT (OUTPATIENT)
Dept: OUTPATIENT SERVICES | Facility: HOSPITAL | Age: 42
LOS: 1 days | Discharge: HOME | End: 2022-11-07

## 2022-11-07 DIAGNOSIS — Z98.890 OTHER SPECIFIED POSTPROCEDURAL STATES: Chronic | ICD-10-CM

## 2022-11-15 NOTE — DISCHARGE NOTE BEHAVIORAL HEALTH - NSBHDCSECLUSIONFT_PSY_A_CORE
----- Message from Ashish Arellano sent at 11/12/2019  4:16 PM CST -----  Martine,     Pt has a referral to neurology for which an appointment has yet to be scheduled, but I see that the pt has had appts and testing with cardiology, as well as a Hospital encounter in which the notes suggested that his issues are more likely cardio than neuro related. Does an appointment with neurology still need to be scheduled? Please advise.     Thank you,   Ashish Arellano  
No/Unable to asses
none

## 2022-12-02 ENCOUNTER — APPOINTMENT (OUTPATIENT)
Dept: PSYCHIATRY | Facility: CLINIC | Age: 42
End: 2022-12-02

## 2022-12-02 ENCOUNTER — OUTPATIENT (OUTPATIENT)
Dept: OUTPATIENT SERVICES | Facility: HOSPITAL | Age: 42
LOS: 1 days | Discharge: HOME | End: 2022-12-02

## 2022-12-02 DIAGNOSIS — Z98.890 OTHER SPECIFIED POSTPROCEDURAL STATES: Chronic | ICD-10-CM

## 2022-12-02 PROCEDURE — 99213 OFFICE O/P EST LOW 20 MIN: CPT | Mod: NC

## 2022-12-02 NOTE — DISCUSSION/SUMMARY
[FreeTextEntry1] : Mr. Donaldson is a 42-year-old, , single, male, unemployed on Public Assistance and recently denied SS-D, domiciled in private residence with his father, with history of depression and 1 prior suicide attempt with 5+ past IPP admissions (last IPP admission was 1/2019 and discharged to [facility]) who presents to OPD for management of depression.\par \par Today, Bladimir presents with relatively stable mood with no new complaints, reporting that he remains well despite self discontinuing medication. Despite Confucianist preoccupation, he does not present as acutely suicidal, manic, or psychotic today and is appropriate to manage on an outpatient basis. Patient reports that he would like to continue to see psychiatrist in the event that he may need medication in the future.\par \par Risk assessment:\par Risk factors include history of suicide attempt, history of IPP admissions, poor social capital, social isolation; protective factors include no acute suicidality, feelings of responsibility towards son, future oriented towards visiting son, reporting decreased stress, no psychotic symptoms or substance use, high spirituality; based on above, low acute risk of suicidal behavior, but chronically elevated risk. \par \par Plan:\par - Patient self-discontinued Citalopram, Abilify, metformin, reports continues to do well. He is not interested in resuming or changing medications at this time.\par - continue supportive therapy.\par - F/u in 1 month\par - patient aware of providers family leave, coverage and emergency protocol

## 2022-12-02 NOTE — PHYSICAL EXAM
[None] : none [Cooperative] : cooperative [Euthymic] : euthymic [Full] : full [Clear] : clear [Linear/Goal Directed] : linear/goal directed [Average] : average [WNL] : within normal limits [FreeTextEntry1] : casually dressed, casually groomed [FreeTextEntry8] : "okay"

## 2022-12-02 NOTE — SOCIAL HISTORY
[FreeTextEntry1] : , single, male, 1 adult son, unemployed on Public Assistance, recently denied SS-D for MDD, domiciled in private residence with his father, 11th grade education, grew up in [place] last worked 2 years ago ("[employer]).
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
## 2022-12-05 DIAGNOSIS — F33.42 MAJOR DEPRESSIVE DISORDER, RECURRENT, IN FULL REMISSION: ICD-10-CM

## 2022-12-14 NOTE — ED BEHAVIORAL HEALTH ASSESSMENT NOTE - CURRENT PLAN
None known Dutasteride Counseling: Dustasteride Counseling:  I discussed with the patient the risks of use of dutasteride including but not limited to decreased libido, decreased ejaculate volume, and gynecomastia. Women who can become pregnant should not handle medication.  All of the patient's questions and concerns were addressed. Dutasteride Male Counseling: Dustasteride Counseling:  I discussed with the patient the risks of use of dutasteride including but not limited to decreased libido, decreased ejaculate volume, and gynecomastia. Women who can become pregnant should not handle medication.  All of the patient's questions and concerns were addressed.

## 2023-01-09 ENCOUNTER — OUTPATIENT (OUTPATIENT)
Dept: OUTPATIENT SERVICES | Facility: HOSPITAL | Age: 43
LOS: 1 days | Discharge: HOME | End: 2023-01-09

## 2023-01-09 DIAGNOSIS — Z98.890 OTHER SPECIFIED POSTPROCEDURAL STATES: Chronic | ICD-10-CM

## 2023-01-23 ENCOUNTER — OUTPATIENT (OUTPATIENT)
Dept: OUTPATIENT SERVICES | Facility: HOSPITAL | Age: 43
LOS: 1 days | Discharge: HOME | End: 2023-01-23

## 2023-01-23 DIAGNOSIS — Z98.890 OTHER SPECIFIED POSTPROCEDURAL STATES: Chronic | ICD-10-CM

## 2023-01-23 DIAGNOSIS — K08.109 COMPLETE LOSS OF TEETH, UNSPECIFIED CAUSE, UNSPECIFIED CLASS: ICD-10-CM

## 2023-02-06 ENCOUNTER — OUTPATIENT (OUTPATIENT)
Dept: OUTPATIENT SERVICES | Facility: HOSPITAL | Age: 43
LOS: 1 days | End: 2023-02-06

## 2023-02-06 DIAGNOSIS — K08.409 PARTIAL LOSS OF TEETH, UNSPECIFIED CAUSE, UNSPECIFIED CLASS: ICD-10-CM

## 2023-02-06 DIAGNOSIS — Z98.890 OTHER SPECIFIED POSTPROCEDURAL STATES: Chronic | ICD-10-CM

## 2023-02-06 PROCEDURE — D5110: CPT

## 2023-02-06 PROCEDURE — D5120: CPT

## 2023-02-09 ENCOUNTER — APPOINTMENT (OUTPATIENT)
Dept: PSYCHIATRY | Facility: CLINIC | Age: 43
End: 2023-02-09
Payer: MEDICARE

## 2023-02-09 ENCOUNTER — OUTPATIENT (OUTPATIENT)
Dept: OUTPATIENT SERVICES | Facility: HOSPITAL | Age: 43
LOS: 1 days | End: 2023-02-09
Payer: MEDICARE

## 2023-02-09 ENCOUNTER — APPOINTMENT (OUTPATIENT)
Dept: PSYCHIATRY | Facility: CLINIC | Age: 43
End: 2023-02-09

## 2023-02-09 DIAGNOSIS — Z98.890 OTHER SPECIFIED POSTPROCEDURAL STATES: Chronic | ICD-10-CM

## 2023-02-09 DIAGNOSIS — Z00.8 ENCOUNTER FOR OTHER GENERAL EXAMINATION: ICD-10-CM

## 2023-02-09 DIAGNOSIS — F33.42 MAJOR DEPRESSIVE DISORDER, RECURRENT, IN FULL REMISSION: ICD-10-CM

## 2023-02-09 PROCEDURE — 99213 OFFICE O/P EST LOW 20 MIN: CPT

## 2023-02-09 PROCEDURE — ZZZZZ: CPT | Mod: 1L

## 2023-02-09 NOTE — SOCIAL HISTORY
[FreeTextEntry1] : , single, male, 1 adult son, unemployed on Public Assistance, recently denied SS-D for MDD, domiciled in private residence with his father, 11th grade education, grew up in [place] ([facility]), last worked 2 years ago ([employer] business).

## 2023-02-09 NOTE — PHYSICAL EXAM
[None] : none [Cooperative] : cooperative [Euthymic] : euthymic [Full] : full [Clear] : clear [Linear/Goal Directed] : linear/goal directed [Average] : average [WNL] : within normal limits [FreeTextEntry1] : casually dressed, casually groomed [FreeTextEntry8] : "good"
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
## 2023-02-09 NOTE — DISCUSSION/SUMMARY
[FreeTextEntry1] : Mr. Donaldson is a 42-year-old, , single, male, unemployed on Public Assistance and recently denied SS-D, domiciled in private residence with his father, with history of depression and 1 prior suicide attempt with 5+ past IPP admissions (last IPP admission was 1/2019 and discharged to [facility]) who presents to OPD for management of depression.\par \par Today, Bladimir presents with relatively stable mood with no new complaints, reporting that he remains well despite self discontinuing medication. Despite Anglican involvement, he does not present as acutely suicidal, manic, or psychotic today and is appropriate to manage on an outpatient basis. Patient reports that he would like to continue to see psychiatrist regularly in the event that he may need medication in the future.\par \par Risk assessment:\par Risk factors include history of suicide attempt, history of IPP admissions, poor social capital, social isolation; protective factors include no acute suicidality, feelings of responsibility towards son, future oriented towards visiting son, reporting decreased stress, no psychotic symptoms or substance use, high spirituality; based on above, low acute risk of suicidal behavior, but chronically elevated risk. \par \par Plan:\par - Patient self-discontinued Citalopram, Abilify, metformin, reports continues to do well. He is not interested in resuming or changing medications at this time.\par - continue supportive therapy.\par - F/u in 1 month

## 2023-02-23 DIAGNOSIS — F33.42 MAJOR DEPRESSIVE DISORDER, RECURRENT, IN FULL REMISSION: ICD-10-CM

## 2023-02-27 ENCOUNTER — OUTPATIENT (OUTPATIENT)
Dept: OUTPATIENT SERVICES | Facility: HOSPITAL | Age: 43
LOS: 1 days | End: 2023-02-27

## 2023-02-27 DIAGNOSIS — Z98.890 OTHER SPECIFIED POSTPROCEDURAL STATES: Chronic | ICD-10-CM

## 2023-02-27 DIAGNOSIS — K08.409 PARTIAL LOSS OF TEETH, UNSPECIFIED CAUSE, UNSPECIFIED CLASS: ICD-10-CM

## 2023-02-27 PROCEDURE — D5120: CPT

## 2023-02-27 PROCEDURE — D5110: CPT

## 2023-02-28 DIAGNOSIS — K08.109 COMPLETE LOSS OF TEETH, UNSPECIFIED CAUSE, UNSPECIFIED CLASS: ICD-10-CM

## 2023-02-28 DIAGNOSIS — K08.409 PARTIAL LOSS OF TEETH, UNSPECIFIED CAUSE, UNSPECIFIED CLASS: ICD-10-CM

## 2023-03-09 ENCOUNTER — OUTPATIENT (OUTPATIENT)
Dept: OUTPATIENT SERVICES | Facility: HOSPITAL | Age: 43
LOS: 1 days | End: 2023-03-09
Payer: MEDICARE

## 2023-03-09 ENCOUNTER — APPOINTMENT (OUTPATIENT)
Dept: PSYCHIATRY | Facility: CLINIC | Age: 43
End: 2023-03-09
Payer: MEDICARE

## 2023-03-09 DIAGNOSIS — Z98.890 OTHER SPECIFIED POSTPROCEDURAL STATES: Chronic | ICD-10-CM

## 2023-03-09 DIAGNOSIS — F33.42 MAJOR DEPRESSIVE DISORDER, RECURRENT, IN FULL REMISSION: ICD-10-CM

## 2023-03-09 PROCEDURE — ZZZZZ: CPT

## 2023-03-09 PROCEDURE — 99213 OFFICE O/P EST LOW 20 MIN: CPT

## 2023-03-09 NOTE — DISCUSSION/SUMMARY
[FreeTextEntry1] : Mr. Donaldson is a 42-year-old, , single, male, unemployed on Public Assistance and recently denied SS-D, domiciled in private residence with his father, with history of depression and 1 prior suicide attempt with 5+ past IPP admissions (last IPP admission was 1/2019 and discharged to [facility]) who presents to OPD for management of depression.\par \par Today, Bladimir presents with relatively stable mood with no new complaints, reporting that he remains well in mood. Despite Orthodoxy involvement, he does not present as acutely suicidal, manic, or psychotic today and is appropriate to manage on an outpatient basis. Patient reports that he would like to continue to see psychiatrist regularly in the event that he may need medication in the future.\par \par Risk assessment:\par Risk factors include history of suicide attempt, history of IPP admissions, poor social capital, social isolation; protective factors include no acute suicidality, feelings of responsibility towards son, future oriented towards visiting son, reporting decreased stress, no psychotic symptoms or substance use, high spirituality; based on above, low acute risk of suicidal behavior, but chronically elevated risk. \par \par Plan:\par - Patient self-discontinued Citalopram, Abilify, metformin, reports continues to do well. He is not interested in resuming or changing medications at this time.\par - F/u in 1 month

## 2023-03-09 NOTE — SOCIAL HISTORY
[FreeTextEntry1] : , single, male, 1 adult son, unemployed on Public Assistance, recently denied SS-D for MDD, domiciled in private residence with his father, 11th grade education, grew up in [place] ([facility]), last worked 2 years ago ([employer], [employer] business).
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
## 2023-03-13 ENCOUNTER — OUTPATIENT (OUTPATIENT)
Dept: OUTPATIENT SERVICES | Facility: HOSPITAL | Age: 43
LOS: 1 days | End: 2023-03-13

## 2023-03-13 DIAGNOSIS — K08.409 PARTIAL LOSS OF TEETH, UNSPECIFIED CAUSE, UNSPECIFIED CLASS: ICD-10-CM

## 2023-03-13 DIAGNOSIS — Z98.890 OTHER SPECIFIED POSTPROCEDURAL STATES: Chronic | ICD-10-CM

## 2023-03-14 DIAGNOSIS — K08.109 COMPLETE LOSS OF TEETH, UNSPECIFIED CAUSE, UNSPECIFIED CLASS: ICD-10-CM

## 2023-03-27 ENCOUNTER — OUTPATIENT (OUTPATIENT)
Dept: OUTPATIENT SERVICES | Facility: HOSPITAL | Age: 43
LOS: 1 days | End: 2023-03-27

## 2023-03-27 DIAGNOSIS — K08.409 PARTIAL LOSS OF TEETH, UNSPECIFIED CAUSE, UNSPECIFIED CLASS: ICD-10-CM

## 2023-03-27 DIAGNOSIS — Z98.890 OTHER SPECIFIED POSTPROCEDURAL STATES: Chronic | ICD-10-CM

## 2023-03-27 PROCEDURE — D5120: CPT

## 2023-03-28 DIAGNOSIS — K08.109 COMPLETE LOSS OF TEETH, UNSPECIFIED CAUSE, UNSPECIFIED CLASS: ICD-10-CM

## 2023-04-10 ENCOUNTER — OUTPATIENT (OUTPATIENT)
Dept: OUTPATIENT SERVICES | Facility: HOSPITAL | Age: 43
LOS: 1 days | End: 2023-04-10
Payer: COMMERCIAL

## 2023-04-10 DIAGNOSIS — K08.409 PARTIAL LOSS OF TEETH, UNSPECIFIED CAUSE, UNSPECIFIED CLASS: ICD-10-CM

## 2023-04-10 DIAGNOSIS — Z98.890 OTHER SPECIFIED POSTPROCEDURAL STATES: Chronic | ICD-10-CM

## 2023-04-10 PROCEDURE — D5120: CPT

## 2023-04-11 DIAGNOSIS — K08.109 COMPLETE LOSS OF TEETH, UNSPECIFIED CAUSE, UNSPECIFIED CLASS: ICD-10-CM

## 2023-04-20 ENCOUNTER — APPOINTMENT (OUTPATIENT)
Dept: PSYCHIATRY | Facility: CLINIC | Age: 43
End: 2023-04-20

## 2023-04-24 ENCOUNTER — OUTPATIENT (OUTPATIENT)
Dept: OUTPATIENT SERVICES | Facility: HOSPITAL | Age: 43
LOS: 1 days | End: 2023-04-24

## 2023-04-24 DIAGNOSIS — K08.409 PARTIAL LOSS OF TEETH, UNSPECIFIED CAUSE, UNSPECIFIED CLASS: ICD-10-CM

## 2023-04-24 DIAGNOSIS — Z98.890 OTHER SPECIFIED POSTPROCEDURAL STATES: Chronic | ICD-10-CM

## 2023-04-26 DIAGNOSIS — K08.109 COMPLETE LOSS OF TEETH, UNSPECIFIED CAUSE, UNSPECIFIED CLASS: ICD-10-CM

## 2023-05-08 ENCOUNTER — OUTPATIENT (OUTPATIENT)
Dept: OUTPATIENT SERVICES | Facility: HOSPITAL | Age: 43
LOS: 1 days | End: 2023-05-08

## 2023-05-08 DIAGNOSIS — Z98.890 OTHER SPECIFIED POSTPROCEDURAL STATES: Chronic | ICD-10-CM

## 2023-05-08 DIAGNOSIS — K08.409 PARTIAL LOSS OF TEETH, UNSPECIFIED CAUSE, UNSPECIFIED CLASS: ICD-10-CM

## 2023-05-08 DIAGNOSIS — K08.109 COMPLETE LOSS OF TEETH, UNSPECIFIED CAUSE, UNSPECIFIED CLASS: ICD-10-CM

## 2023-05-15 ENCOUNTER — OUTPATIENT (OUTPATIENT)
Dept: OUTPATIENT SERVICES | Facility: HOSPITAL | Age: 43
LOS: 1 days | End: 2023-05-15
Payer: COMMERCIAL

## 2023-05-15 DIAGNOSIS — Z98.890 OTHER SPECIFIED POSTPROCEDURAL STATES: Chronic | ICD-10-CM

## 2023-05-15 DIAGNOSIS — K02.9 DENTAL CARIES, UNSPECIFIED: ICD-10-CM

## 2023-05-15 PROCEDURE — D0140: CPT

## 2023-05-17 DIAGNOSIS — K08.109 COMPLETE LOSS OF TEETH, UNSPECIFIED CAUSE, UNSPECIFIED CLASS: ICD-10-CM

## 2023-05-22 ENCOUNTER — OUTPATIENT (OUTPATIENT)
Dept: OUTPATIENT SERVICES | Facility: HOSPITAL | Age: 43
LOS: 1 days | End: 2023-05-22
Payer: COMMERCIAL

## 2023-05-22 DIAGNOSIS — Z98.890 OTHER SPECIFIED POSTPROCEDURAL STATES: Chronic | ICD-10-CM

## 2023-05-22 DIAGNOSIS — K02.9 DENTAL CARIES, UNSPECIFIED: ICD-10-CM

## 2023-05-22 PROCEDURE — D0170: CPT

## 2023-05-23 NOTE — PATIENT PROFILE BEHAVIORAL HEALTH - CURRENT SWALLOWING
Inpatient Rehabilitation Plan of Care Note    Plan of Care  Care Plan Reviewed - No updates at this time.    Psychosocial    [RN] Coping/Adjustment(Active)  Current Status(05/23/2023): At risk for poor coping due to recent medical  issues.  Weekly Goal(05/30/2023): Identify progress in functional status.  Discharge Goal: Demonstrates healthy coping skills.    Performed Intervention(s)  Medication.  Group therapy.      Safety    [RN] Potential for Injury(Active)  Current Status(05/23/2023): Hx. falls. At risk for falling in the hospital.  Weekly Goal(05/30/2023): Cue to use the call bell.  Discharge Goal: Patient/ family will be aware of the risk of falling in the home  setting.    Performed Intervention(s)  Items within reach.  Safety rounds.  Wheelchair, bed, and chair alarms.      Sphincter Control    [RN] Bladder Management(Active)  Current Status(05/23/2023): 100% continent of bladder.  Weekly Goal(05/30/2023): Remains 100% continent of bladder.  Discharge Goal: Goes home continent of bladder.    [RN] Bowel Management(Active)  Current Status(05/23/2023): 100% continent of bowel.  Weekly Goal(05/30/2023): Remains continent of bowel.  Discharge Goal: goes home continent of bowel.    Performed Intervention(s)  Offer restroom.  Bladder training program.  Use incontinence products.  Encourage appropriate diet.  Encourage fluid intake.    Signed by: Yelitza Talavera RN     (0) swallows foods and liquids w/o difficulty

## 2023-05-31 DIAGNOSIS — K08.109 COMPLETE LOSS OF TEETH, UNSPECIFIED CAUSE, UNSPECIFIED CLASS: ICD-10-CM

## 2023-06-16 ENCOUNTER — NON-APPOINTMENT (OUTPATIENT)
Age: 43
End: 2023-06-16

## 2023-06-23 ENCOUNTER — APPOINTMENT (OUTPATIENT)
Dept: PSYCHIATRY | Facility: CLINIC | Age: 43
End: 2023-06-23

## 2023-10-02 ENCOUNTER — NON-APPOINTMENT (OUTPATIENT)
Age: 43
End: 2023-10-02

## 2023-10-18 ENCOUNTER — NON-APPOINTMENT (OUTPATIENT)
Age: 43
End: 2023-10-18

## 2023-11-17 NOTE — PROGRESS NOTE ADULT - PROBLEM SELECTOR PLAN 1
continue present treatment as per psych plan as reviewed  Medically stable with no new changes in treatment  will continue to monitor medical status while being treated on psych Unknown if ever smoked

## 2023-11-29 ENCOUNTER — APPOINTMENT (OUTPATIENT)
Dept: PSYCHIATRY | Facility: CLINIC | Age: 43
End: 2023-11-29
Payer: MEDICARE

## 2023-11-29 ENCOUNTER — OUTPATIENT (OUTPATIENT)
Dept: OUTPATIENT SERVICES | Facility: HOSPITAL | Age: 43
LOS: 1 days | End: 2023-11-29
Payer: MEDICARE

## 2023-11-29 DIAGNOSIS — F33.42 MAJOR DEPRESSIVE DISORDER, RECURRENT, IN FULL REMISSION: ICD-10-CM

## 2023-11-29 PROCEDURE — 99213 OFFICE O/P EST LOW 20 MIN: CPT

## 2023-11-29 PROCEDURE — ZZZZZ: CPT

## 2023-11-30 DIAGNOSIS — F33.42 MAJOR DEPRESSIVE DISORDER, RECURRENT, IN FULL REMISSION: ICD-10-CM

## 2023-12-04 ENCOUNTER — EMERGENCY (EMERGENCY)
Facility: HOSPITAL | Age: 43
LOS: 0 days | Discharge: HOME | End: 2023-12-04
Attending: EMERGENCY MEDICINE
Payer: MEDICARE

## 2023-12-04 VITALS
TEMPERATURE: 97 F | DIASTOLIC BLOOD PRESSURE: 91 MMHG | OXYGEN SATURATION: 98 % | HEIGHT: 64 IN | HEART RATE: 87 BPM | WEIGHT: 130.07 LBS | RESPIRATION RATE: 17 BRPM | SYSTOLIC BLOOD PRESSURE: 138 MMHG
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
## 2023-12-04 DIAGNOSIS — M25.561 PAIN IN RIGHT KNEE: ICD-10-CM
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
## 2023-12-04 DIAGNOSIS — Z91.018 ALLERGY TO OTHER FOODS: ICD-10-CM

## 2023-12-04 DIAGNOSIS — M25.511 PAIN IN RIGHT SHOULDER: ICD-10-CM

## 2023-12-04 DIAGNOSIS — F32.A DEPRESSION, UNSPECIFIED: ICD-10-CM

## 2023-12-04 DIAGNOSIS — Z98.890 OTHER SPECIFIED POSTPROCEDURAL STATES: Chronic | ICD-10-CM

## 2023-12-04 PROCEDURE — 73030 X-RAY EXAM OF SHOULDER: CPT | Mod: RT

## 2023-12-04 PROCEDURE — 99284 EMERGENCY DEPT VISIT MOD MDM: CPT

## 2023-12-04 PROCEDURE — 99284 EMERGENCY DEPT VISIT MOD MDM: CPT | Mod: 25

## 2023-12-04 PROCEDURE — 73562 X-RAY EXAM OF KNEE 3: CPT | Mod: RT

## 2023-12-04 PROCEDURE — 73562 X-RAY EXAM OF KNEE 3: CPT | Mod: 26,RT

## 2023-12-04 PROCEDURE — 73030 X-RAY EXAM OF SHOULDER: CPT | Mod: 26,RT

## 2023-12-04 RX ORDER — IBUPROFEN 200 MG
600 TABLET ORAL ONCE
Refills: 0 | Status: DISCONTINUED | OUTPATIENT
Start: 2023-12-04 | End: 2023-12-04

## 2023-12-04 NOTE — ED PROVIDER NOTE - NSFOLLOWUPINSTRUCTIONS_ED_ALL_ED_FT
Our Emergency Department Referral Coordinators will be reaching out to you in the next 24-48 hours from 9:00am to 5:00pm with a follow up ORTHO appointment. Please expect a phone call from the hospital in that time frame. If you do not receive a call or if you have any questions or concerns, you can reach them at [phone].    Joint Pain    Joint pain may be caused by many things. Joint pain is likely to go away when you follow instructions from your health care provider for relieving pain at home. However, joint pain can also be caused by conditions that require more treatment. Common causes of joint pain include:  Bruising in the area of the joint.  Injury caused by repeating certain movements too many times.  Age-related joint wear and tear.  Buildup of uric acid crystals in the joint (gout).  Inflammation of the joint.  Other forms of arthritis.  Infections of the joint or of the bone.  Your health care provider may recommend that you take pain medicine or wear a supportive device like an elastic bandage, sling, or splint. If your joint pain continues, you may need lab or imaging tests to diagnose the cause of your joint pain.    Follow these instructions at home:  Managing pain, stiffness, and swelling        If directed, put ice on the painful area. To do this:  If you have a removable elastic bandage, sling, or splint, take it off as told by your doctor.  Put ice in a plastic bag.  Place a towel between your skin and the bag.  Leave the ice on for 20 minutes, 2–3 times a day.  Remove the ice if your skin turns bright red. This is very important. If you cannot feel pain, heat, or cold, you have a greater risk of damage to the area.  Move your fingers and toes often to reduce stiffness and swelling.  Raise the injured area above the level of your heart while you are sitting or lying down.  If directed, apply heat to the painful area as often as told by your health care provider. Use the heat source that your health care provider recommends, such as a moist heat pack or a heating pad.  Place a towel between your skin and the heat source.  Leave the heat on for 20–30 minutes.  Remove the heat if your skin turns bright red. This is especially important if you are unable to feel pain, heat, or cold. You have a greater risk of getting burned.  Activity    Rest as told by your health care provider. Do not do anything that causes or worsens pain.  Begin exercising or stretching the affected area as told by your health care provider.  Return to your normal activities as told by your health care provider. Ask your health care provider what activities are safe for you.  If you have an elastic bandage, sling, or splint:    Wear the bandage, sling, or splint as told by your health care provider. Remove it only as told by your health care provider.  Loosen it if your fingers or toes below the joint tingle, become numb, or turn cold and blue.  Keep it clean.  Ask your health care provider if you should remove it before bathing.  If the bandage, sling, or splint is not waterproof:  Do not let it get wet.  Cover it with a watertight covering when you take a bath or shower.  General instructions    Treatment may include medicines for pain and inflammation that are taken by mouth or applied to the skin. Take over-the-counter and prescription medicines only as told by your health care provider.  Do not use any products that contain nicotine or tobacco, such as cigarettes, e-cigarettes, and chewing tobacco. If you need help quitting, ask your health care provider.  Keep all follow-up visits. This is important.  Contact a health care provider if:  You have pain that gets worse and does not get better with medicine.  Your joint pain does not improve within 3 days.  You have increased bruising or swelling.  You have a fever.  You lose 10 lb (4.5 kg) or more without trying.  Get help right away if:  You cannot move the joint.  Your fingers or toes tingle, become numb, or turn cold and blue.  You have a fever along with a joint that is red, warm, and swollen.  Summary  Joint pain may be caused by many things.  Your health care provider may recommend that you take pain medicine or wear a supportive device such as an elastic bandage, sling, or splint.  If your joint pain continues, you may need tests to diagnose the cause of your joint pain.  Take over-the-counter and prescription medicines only as told by your health care provider. Our Emergency Department Referral Coordinators will be reaching out to you in the next 24-48 hours from 9:00am to 5:00pm with a follow up ORTHO appointment. Please expect a phone call from the hospital in that time frame. If you do not receive a call or if you have any questions or concerns, you can reach them at [phone].    Joint Pain    Joint pain may be caused by many things. Joint pain is likely to go away when you follow instructions from your health care provider for relieving pain at home. However, joint pain can also be caused by conditions that require more treatment. Common causes of joint pain include:  Bruising in the area of the joint.  Injury caused by repeating certain movements too many times.  Age-related joint wear and tear.  Buildup of uric acid crystals in the joint (gout).  Inflammation of the joint.  Other forms of arthritis.  Infections of the joint or of the bone.  Your health care provider may recommend that you take pain medicine or wear a supportive device like an elastic bandage, sling, or splint. If your joint pain continues, you may need lab or imaging tests to diagnose the cause of your joint pain.    Follow these instructions at home:  Managing pain, stiffness, and swelling        If directed, put ice on the painful area. To do this:  If you have a removable elastic bandage, sling, or splint, take it off as told by your doctor.  Put ice in a plastic bag.  Place a towel between your skin and the bag.  Leave the ice on for 20 minutes, 2–3 times a day.  Remove the ice if your skin turns bright red. This is very important. If you cannot feel pain, heat, or cold, you have a greater risk of damage to the area.  Move your fingers and toes often to reduce stiffness and swelling.  Raise the injured area above the level of your heart while you are sitting or lying down.  If directed, apply heat to the painful area as often as told by your health care provider. Use the heat source that your health care provider recommends, such as a moist heat pack or a heating pad.  Place a towel between your skin and the heat source.  Leave the heat on for 20–30 minutes.  Remove the heat if your skin turns bright red. This is especially important if you are unable to feel pain, heat, or cold. You have a greater risk of getting burned.  Activity    Rest as told by your health care provider. Do not do anything that causes or worsens pain.  Begin exercising or stretching the affected area as told by your health care provider.  Return to your normal activities as told by your health care provider. Ask your health care provider what activities are safe for you.  If you have an elastic bandage, sling, or splint:    Wear the bandage, sling, or splint as told by your health care provider. Remove it only as told by your health care provider.  Loosen it if your fingers or toes below the joint tingle, become numb, or turn cold and blue.  Keep it clean.  Ask your health care provider if you should remove it before bathing.  If the bandage, sling, or splint is not waterproof:  Do not let it get wet.  Cover it with a watertight covering when you take a bath or shower.  General instructions    Treatment may include medicines for pain and inflammation that are taken by mouth or applied to the skin. Take over-the-counter and prescription medicines only as told by your health care provider.  Do not use any products that contain nicotine or tobacco, such as cigarettes, e-cigarettes, and chewing tobacco. If you need help quitting, ask your health care provider.  Keep all follow-up visits. This is important.  Contact a health care provider if:  You have pain that gets worse and does not get better with medicine.  Your joint pain does not improve within 3 days.  You have increased bruising or swelling.  You have a fever.  You lose 10 lb (4.5 kg) or more without trying.  Get help right away if:  You cannot move the joint.  Your fingers or toes tingle, become numb, or turn cold and blue.  You have a fever along with a joint that is red, warm, and swollen.  Summary  Joint pain may be caused by many things.  Your health care provider may recommend that you take pain medicine or wear a supportive device such as an elastic bandage, sling, or splint.  If your joint pain continues, you may need tests to diagnose the cause of your joint pain.  Take over-the-counter and prescription medicines only as told by your health care provider.

## 2023-12-04 NOTE — ED PROVIDER NOTE - PHYSICAL EXAMINATION
Constitutional: Well developed, well nourished, no acute distress  Head: Normocephalic, Atraumatic  ENT: Moist mucous membranes, no rhinorrhea  Neck: Supple, Nontender  Respiratory: Normal chest excursion with respiration; Breath sounds clear and equal B/L; No wheezes, rales, or rhonchi   Cardiovascular: RRR; Normal S1, S2; No murmurs, rubs or gallops   ABD/GI:  Nondistended; Nontender; No guarding, rigidity or rebound  EXT/MS: Moving all extremities; Distal pulses 2+ B/l, FROM at right shoulder elbow and wrist, FROM at right hip knee and ankle, anterior right knee tenderness to palpation, anterior right shoulder tenderness to palpation, pt able to ambulate with no deficits in gait, 5/5 strength in upper and lower extremities   Skin: Normal for age and race; Warm and dry; No rash  Neurologic: AAO x 4; CN 2-12 intact; Normal motor and sensory function

## 2023-12-04 NOTE — ED PROVIDER NOTE - PATIENT PORTAL LINK FT
Called patient to discuss liquid diet and vitamins.    Pt using Premier Protein Clear, Premier Protein RTD     Discussion:  -  gms of protein per day  - 600-800 calories per day   - Less than 4gm sugar per shake  - SF, Decaf, non-carbonated Fluids  - No Fruits, juices, yogurt or pudding on liquid diet  - No vitamins or minerals for 1 week prior to surgery    Reminded pt of pre-op and surgery dates.    Pt to call back with any questions.           You can access the FollowMyHealth Patient Portal offered by [facility] by registering at the following website: [email or web address]. By joining [employer]’s FollowMyHealth portal, you will also be able to view your health information using other applications (apps) compatible with our system. You can access the FollowMyHealth Patient Portal offered by [facility] by registering at the following website: [email or web address]. By joining [employer]’s FollowMyHealth portal, you will also be able to view your health information using other applications (apps) compatible with our system.

## 2023-12-04 NOTE — ED PROVIDER NOTE - PROGRESS NOTE DETAILS
Patient reevaluated and states his pain has improved.  Patient will be discharged with orthopedic follow-up.  Patient advised on taking Advil in combination with Tylenol for pain control at home.

## 2023-12-04 NOTE — ED PROVIDER NOTE - ATTENDING CONTRIBUTION TO CARE
I have reviewed and agree with the mid-level note, except as documented in my note below.    43-year-old male presents with right shoulder pain for the past 1 to 2 weeks and right knee pain since today (states hit his knee at work on a metal pole), sx are constant, worse with certain movements and position, better with rest, denies exertional nature to pain, chest pain, SOB, nausea, vomiting, diaphoresis, hemoptysis, fever, tactile temp, chills, paresthesias, focal weakness, or other associated complaints at present. Old chart reviewed. I have reviewed and agree with the initial nursing note, except as documented in my note.    VSS, awake, alert, non-toxic appearing, no skin rash or lesions, chest CTAB, no w/r/r, +S1/S2, RRR, no m/r/g, abdomen soft, NT, ND, +BS, RUE: No scapular, clavicle, AC joint, elbow, wrist, hand tenderness, motor and sensation intact distally, NV intact distally with 2+ radial pulses, shoulder; appears symmetrical, no gross deformity, no anterior fullness of anterior shoulder, humeral head palpated in glenohumeral joint, no crepitus, point tenderness, erythema, warmth, skin breakdown or lesions, diffusely tender to palpation, sensation intact over deltoid region, active ROM painful but intact, passive ROM intact. RLE; no hip, ankle or foot tenderness, knee; appears symmetrical, no gross deformity, swelling, crepitus, joint line tenderness, able to fully flex and extend knee without encouragement, no joint laxity noted on varus or valgus stress at 0 and 30 degrees, normal Lachman and posterior drawer, motor and sensation intact distally, NV intact with 2+ DP pulses, normal gait. No warmth, erythema, induration, fluctuance, lymphangitis or purulence.

## 2023-12-06 ENCOUNTER — APPOINTMENT (OUTPATIENT)
Dept: ORTHOPEDIC SURGERY | Facility: CLINIC | Age: 43
End: 2023-12-06
Payer: MEDICARE

## 2023-12-06 VITALS — HEIGHT: 64 IN

## 2023-12-06 DIAGNOSIS — M19.019 PRIMARY OSTEOARTHRITIS, UNSPECIFIED SHOULDER: ICD-10-CM

## 2023-12-06 PROCEDURE — 99203 OFFICE O/P NEW LOW 30 MIN: CPT

## 2024-01-05 ENCOUNTER — APPOINTMENT (OUTPATIENT)
Dept: ORTHOPEDIC SURGERY | Facility: CLINIC | Age: 44
End: 2024-01-05

## 2024-01-30 ENCOUNTER — NON-APPOINTMENT (OUTPATIENT)
Age: 44
End: 2024-01-30

## 2024-01-31 ENCOUNTER — APPOINTMENT (OUTPATIENT)
Dept: PSYCHIATRY | Facility: CLINIC | Age: 44
End: 2024-01-31

## 2024-02-28 NOTE — DISCUSSION/SUMMARY
[FreeTextEntry1] : Patient was scheduled for follow up appointment jan 24, 2024  @ 9:00am Patient did not present to appointment, patient was called and reported he was at work and asked to reschedule.  Patient was rescheduled to wed jan 31, 2024 @ 9:00.  Patient once more, did not show.  Patient was called and did not  phone call for outreach.

## 2024-04-09 ENCOUNTER — NON-APPOINTMENT (OUTPATIENT)
Age: 44
End: 2024-04-09

## 2024-04-09 NOTE — DISCUSSION/SUMMARY
[FreeTextEntry1] : Called patient to follow up.  Patient picked up, said he was doing well.  Spoke to patient about clinic closure policy and patient reported he would like to continue with us.  Patient scheduled for follow up wed april 24 @ 9:00. Patient understands that if appointment is missed then case will be closed but can be reopened once patient presents for follow up appointments.

## 2024-04-24 ENCOUNTER — NON-APPOINTMENT (OUTPATIENT)
Age: 44
End: 2024-04-24

## 2024-05-20 NOTE — DISCUSSION/SUMMARY
[FreeTextEntry1] : Patient has been scheduled for follow up 4/24/24.  Patient came in to office at 8am to tell  staff he would have to reschedule as he would be late for work.  Provider reached out to patient. Patient reported he was doing well. Patient rescheduled for may 20th at 5pm in person

## 2024-05-21 ENCOUNTER — APPOINTMENT (OUTPATIENT)
Dept: PSYCHIATRY | Facility: CLINIC | Age: 44
End: 2024-05-21
Payer: MEDICARE

## 2024-05-21 ENCOUNTER — OUTPATIENT (OUTPATIENT)
Dept: OUTPATIENT SERVICES | Facility: HOSPITAL | Age: 44
LOS: 1 days | End: 2024-05-21
Payer: MEDICARE

## 2024-05-21 DIAGNOSIS — F33.42 MAJOR DEPRESSIVE DISORDER, RECURRENT, IN FULL REMISSION: ICD-10-CM

## 2024-05-21 PROCEDURE — ZZZZZ: CPT

## 2024-05-21 PROCEDURE — 99213 OFFICE O/P EST LOW 20 MIN: CPT
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
## 2024-05-21 NOTE — SOCIAL HISTORY
[FreeTextEntry1] : , single, male, 1 adult son, unemployed on Public Assistance, recently denied SS-D for MDD, domiciled in private residence with his father, 11th grade education, grew up in [place] last worked 2 years ago ([employer], [employer] business).

## 2024-05-21 NOTE — DISCUSSION/SUMMARY
[FreeTextEntry1] : Mr. Donaldson is a 43-year-old, , single, male, currently working in inventory at [employer], domiciled in private residence with his father, with history of depression and 1 prior suicide attempt with 5+ past IPP admissions (last IPP admission was 1/2019 and discharged to [facility]) who presents to OPD for management of depression.  Today, Bladimir presents with relatively stable mood with no new complaints. He reports he is looking to get into a more stable career. He denied depressive symptoms, he does not present as acutely suicidal, manic, or psychotic today and is appropriate to manage on an outpatient basis.  Risk assessment: Risk factors include history of suicide attempt, history of IPP admissions, lower social capital; protective factors include no acute suicidality, feelings of responsibility towards son, increased social capital and improvement in social isolation, future oriented towards, no psychotic symptoms or substance use, high spirituality; based on above, low acute risk of suicidal behavior, but chronically elevated risk.   Plan: - Patient self-discontinued Citalopram, Abilify, metformin in early 2021 but has remains stable off medication since then. He is not interested in resuming or changing medications at this time. - F/u in 1 month - Transition of care discussed

## 2024-05-22 DIAGNOSIS — F33.42 MAJOR DEPRESSIVE DISORDER, RECURRENT, IN FULL REMISSION: ICD-10-CM

## 2024-06-25 ENCOUNTER — NON-APPOINTMENT (OUTPATIENT)
Age: 44
End: 2024-06-25

## 2024-06-25 ENCOUNTER — APPOINTMENT (OUTPATIENT)
Dept: PSYCHIATRY | Facility: CLINIC | Age: 44
End: 2024-06-25

## 2024-06-28 ENCOUNTER — NON-APPOINTMENT (OUTPATIENT)
Age: 44
End: 2024-06-28

## 2024-07-15 ENCOUNTER — OUTPATIENT (OUTPATIENT)
Dept: OUTPATIENT SERVICES | Facility: HOSPITAL | Age: 44
LOS: 1 days | End: 2024-07-15
Payer: MEDICARE

## 2024-07-15 ENCOUNTER — APPOINTMENT (OUTPATIENT)
Dept: PSYCHIATRY | Facility: CLINIC | Age: 44
End: 2024-07-15
Payer: MEDICARE

## 2024-07-15 DIAGNOSIS — F33.42 MAJOR DEPRESSIVE DISORDER, RECURRENT, IN FULL REMISSION: ICD-10-CM

## 2024-07-15 DIAGNOSIS — Z98.890 OTHER SPECIFIED POSTPROCEDURAL STATES: Chronic | ICD-10-CM

## 2024-07-15 PROCEDURE — ZZZZZ: CPT

## 2024-07-15 PROCEDURE — 99213 OFFICE O/P EST LOW 20 MIN: CPT

## 2024-07-16 DIAGNOSIS — F33.42 MAJOR DEPRESSIVE DISORDER, RECURRENT, IN FULL REMISSION: ICD-10-CM

## 2024-09-09 ENCOUNTER — OUTPATIENT (OUTPATIENT)
Dept: OUTPATIENT SERVICES | Facility: HOSPITAL | Age: 44
LOS: 1 days | End: 2024-09-09
Payer: MEDICARE

## 2024-09-09 ENCOUNTER — APPOINTMENT (OUTPATIENT)
Dept: PSYCHIATRY | Facility: CLINIC | Age: 44
End: 2024-09-09
Payer: MEDICARE

## 2024-09-09 DIAGNOSIS — Z98.890 OTHER SPECIFIED POSTPROCEDURAL STATES: Chronic | ICD-10-CM

## 2024-09-09 DIAGNOSIS — F33.42 MAJOR DEPRESSIVE DISORDER, RECURRENT, IN FULL REMISSION: ICD-10-CM

## 2024-09-09 PROCEDURE — ZZZZZ: CPT

## 2024-09-09 PROCEDURE — 99213 OFFICE O/P EST LOW 20 MIN: CPT

## 2024-09-09 NOTE — SOCIAL HISTORY
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

## 2024-09-09 NOTE — DISCUSSION/SUMMARY
[FreeTextEntry1] : Mr. Donaldson is a 43-year-old, , single, male, currently working in inventory at [employer], domiciled in private residence with his father, with history of depression and 1 prior suicide attempt with 5+ past IPP admissions (last IPP admission was 1/2019 and discharged to [facility]) who presents to [facility]PD for management of depression.  Upon evaluation, he is calm and cooperative. He denies depressive, manic, or psychotic symptoms. He denies suicidal or homicidal ideation, intent, or plan. No perceptual disturbances elicited. He is hopeful and future oriented to move in with his fiancee.  Risk assessment: Risk factors include history of suicide attempt, history of IPP admissions, lower social capital; protective factors include no acute suicidality, feelings of responsibility towards son, responsibility to fiancee,  increased social capital and improvement in social isolation, future oriented towards, no psychotic symptoms or substance use, high spirituality; based on above, low acute risk of suicidal behavior, but chronically elevated risk.   Plan: Follow up in 6-8 weeks Patient has not been prescribed medication since 2021 and has remained stable He does not wish to re-initiate medication however will continue to follow, should medication need to be initiated in the future

## 2024-09-09 NOTE — PHYSICAL EXAM
[None] : none [Cooperative] : cooperative [Euthymic] : euthymic [Full] : full [Clear] : clear [Linear/Goal Directed] : linear/goal directed [Average] : average [WNL] : within normal limits [FreeTextEntry1] : casually dressed, casually groomed [FreeTextEntry8] : "well"
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
## 2024-09-10 DIAGNOSIS — F33.42 MAJOR DEPRESSIVE DISORDER, RECURRENT, IN FULL REMISSION: ICD-10-CM

## 2024-09-19 ENCOUNTER — NON-APPOINTMENT (OUTPATIENT)
Age: 44
End: 2024-09-19

## 2024-09-26 ENCOUNTER — NON-APPOINTMENT (OUTPATIENT)
Age: 44
End: 2024-09-26

## 2024-10-02 ENCOUNTER — NON-APPOINTMENT (OUTPATIENT)
Age: 44
End: 2024-10-02

## 2024-10-24 ENCOUNTER — APPOINTMENT (OUTPATIENT)
Dept: NEUROLOGY | Facility: CLINIC | Age: 44
End: 2024-10-24
Payer: MEDICARE

## 2024-10-24 VITALS
SYSTOLIC BLOOD PRESSURE: 114 MMHG | HEIGHT: 64 IN | BODY MASS INDEX: 21.85 KG/M2 | DIASTOLIC BLOOD PRESSURE: 75 MMHG | HEART RATE: 96 BPM | WEIGHT: 128 LBS

## 2024-10-24 DIAGNOSIS — B02.23 POSTHERPETIC POLYNEUROPATHY: ICD-10-CM

## 2024-10-24 PROCEDURE — 99203 OFFICE O/P NEW LOW 30 MIN: CPT

## 2024-10-24 RX ORDER — GABAPENTIN 300 MG
300 TABLET ORAL
Refills: 0 | Status: ACTIVE | COMMUNITY

## 2024-10-24 RX ORDER — GABAPENTIN 600 MG/1
600 TABLET, COATED ORAL 3 TIMES DAILY
Qty: 90 | Refills: 5 | Status: ACTIVE | COMMUNITY
Start: 2024-10-24 | End: 1900-01-01

## 2024-10-24 RX ORDER — GLIPIZIDE 10 MG/1
10 TABLET ORAL
Refills: 0 | Status: ACTIVE | COMMUNITY

## 2024-10-24 RX ORDER — SITAGLIPTIN AND METFORMIN HYDROCHLORIDE 50; 1000 MG/1; MG/1
TABLET, FILM COATED ORAL
Refills: 0 | Status: ACTIVE | COMMUNITY

## 2024-10-29 ENCOUNTER — APPOINTMENT (OUTPATIENT)
Dept: PSYCHIATRY | Facility: CLINIC | Age: 44
End: 2024-10-29

## 2024-11-17 NOTE — ED PROVIDER NOTE - OBJECTIVE STATEMENT
36 yo male with PMHx presents c/o depressive symptoms. Patient states he has been feeling much more depressed over last week and wants to speak with a psychiatrist. Patient has been seeing therapist but has not been taking medications. Patient has attempted suicide in the past but has not tried today. Patient denies HI, visual hallucinations, auditory hallucinations, fevers, chest pain, SOB, abdominal pain, nausea, vomiting, diarrhea, constipation, dizziness, weakness, recent travel, leg pain/swelling, changes in PO intake, changes in urine output, changes in mental status. 17-Nov-2024 20:31

## 2025-01-28 NOTE — DISCHARGE NOTE BEHAVIORAL HEALTH - NSBHDCPHYSCONTACTFT_PSY_A_CORE
Per Dr Awan and Dominiqeu Pate CNP, all refills must be requested by the patient not pharmacy.   [phone]

## 2025-03-13 NOTE — ED ADULT NURSE NOTE - NSFALLRSKASSESSTYPE_ED_ALL_ED
Patient returned call, was informed of below. No questions at this time. Appointments changed   Initial (On Arrival)

## 2025-04-28 ENCOUNTER — APPOINTMENT (OUTPATIENT)
Dept: NEUROLOGY | Facility: CLINIC | Age: 45
End: 2025-04-28